# Patient Record
Sex: FEMALE | Race: WHITE | Employment: STUDENT | URBAN - METROPOLITAN AREA
[De-identification: names, ages, dates, MRNs, and addresses within clinical notes are randomized per-mention and may not be internally consistent; named-entity substitution may affect disease eponyms.]

---
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
## 2018-01-02 NOTE — PROGRESS NOTES
.[facility] [address] [facility] THERAPY  [address] [facility], [facility] [address],[facility], [facility] - Phone: [phone]  Fax: [phone]  DISCHARGE NOTE  Patient Name: Fadi Chowdhury : 2000   Treatment/Medical Diagnosis: Right shoulder pain [M25.511]   Referral Source: Sara Villa MD     Date of Initial Visit: 10/23/17 Attended Visits: 11 Missed Visits: 0     SUMMARY OF TREATMENT  Pt was seen for IE and 10 f/u visits with treatment consisting of therapeutic exercises for scapular stabilization/shoudler strengthening, manual therapy (prom/mobs/stm/stretching), return to throw via protocol and modalities prn. In addition pt was instructed on hep. CURRENT STATUS  Pt returned for only one visit after pn. The below findings were as of that visit. Goal/Measure of Progress Goal Met? 1. Pt will increase FOTO score >/=79/100 to show functional improvement   Status at last Eval: 56/100 Current Status: 84/100 yes   2. Pt will note +5 on GROC in order to show functional improvement   Status at last Eval: na Current Status: +5 yes   3. Pt will report daily max pain </=1/10 to facilitate return to sport with no restrictions   Status at last Eval: 10/10 Current Status: 2/10 progressing       RECOMMENDATIONS  D/c with hep  If you have any questions/comments please contact us directly at [phone]. Thank you for allowing us to assist in the care of your patient.     Therapist Signature: Ivonne Bass DPT, PRECIOUS Date: 2018     Time: 9:29 AM   NOTE TO PHYSICIAN:  PLEASE COMPLETE THE ORDERS BELOW AND FAX TO   [facility] Physical Therapy: [phone]  If you are unable to process this request in 24 hours please contact our office: [phone]    ___ I have read the above report and request that my patient continue as recommended.   ___ I have read the above report and request that my patient continue therapy with the following changes/special instructions:_________________________________________________________   ___ I have read the above report and request that my patient be discharged from therapy.      Physician Signature:        Date:       Time:

## 2018-04-11 ENCOUNTER — APPOINTMENT (OUTPATIENT)
Dept: PHYSICAL THERAPY | Age: 18
End: 2018-04-11
Payer: COMMERCIAL

## 2018-04-12 ENCOUNTER — HOSPITAL ENCOUNTER (OUTPATIENT)
Dept: PHYSICAL THERAPY | Age: 18
Discharge: HOME | End: 2018-04-12
Payer: COMMERCIAL

## 2018-04-12 PROCEDURE — 97162 PT EVAL MOD COMPLEX 30 MIN: CPT

## 2018-04-12 PROCEDURE — 97535 SELF CARE MNGMENT TRAINING: CPT

## 2018-04-12 PROCEDURE — 97140 MANUAL THERAPY 1/> REGIONS: CPT

## 2018-04-12 NOTE — PROGRESS NOTES
[address] PHYSICAL THERAPY  [address] [place] [address], [facility] [address],[facility], [address] [facility] - Phone: [phone]  Fax: [phone] / [address] [facility]  Patient Name: Elsa Brooks : 2000   Medical   Diagnosis: Right shoulder pain [M25.511] Treatment Diagnosis: Right shoulder pain [M25.511]   Onset Date: 2018     Referral Source: Indira Hernandez MD [facility]): 2018   Prior Hospitalization: See medical history Provider #: [id number]   Prior Level of Function: Pain free throwing   Comorbidities: none   Medications: Verified on Patient Summary List   The Plan of Care and following information is based on the information from the initial evaluation.   ===========================================================================================  Assessment / key information:  Elsa Brooks is a 21 y.o.  yo female with Dx of Right shoulder pain [M25.511]. She reports the onset of R shoulder pain after sliding into a base during a soft ball game in . She currently rates her pain as 10/10 at worst, 2/10 at best, primarily located at anterior and lateral aspect of her R shoudler. She complains of difficulty and increase pain with overhead activity and throwing. Her recent MRI result confirmed R labral tear. She also complains of numbness/tingling at the follow through portion of throwing. Objective Findings:  Shoulder AROM: Flx: R = 155 deg, L = 175 deg, Scaption: R = 162 deg, L = 180 deg,   Ext: R = 70 deg, L = 74 deg,  ER: R = 80 deg, L = 90 deg,  IR: R = 27 cm measured from C7 to thumb behind the back, L = 22 cm measured from C7 to thumb behind the back, Manual Muscle Testing:  R shoulder ER is slightly Reduced with pain. Special Test:    Upper Limb Tension Test: + on R. All cervical tests were negative.    Pt instructed in HEP and will f/u in clinic for PT.  ===========================================================================================  Eval Complexity: History MEDIUM  Complexity : 1-2 comorbidities / personal factors will impact the outcome/ POC ;  Examination  MEDIUM Complexity : 3 Standardized tests and measures addressing body structure, function, activity limitation and / or participation in recreation ; Presentation MEDIUM Complexity : Evolving with changing characteristics ; Decision Making MEDIUM Complexity : FOTO score of 26-74; Overall Complexity MEDIUM  Problem List: pain affecting function, decrease ROM, decrease strength, decrease ADL/ functional abilitiies, decrease activity tolerance and decrease flexibility/ joint mobility   Treatment Plan may include any combination of the following: Therapeutic exercise, Therapeutic activities, Neuromuscular re-education, Physical agent/modality, Manual therapy, Patient education, Self Care training and Functional mobility training  Patient / Family readiness to learn indicated by: asking questions, trying to perform skills and interest  Persons(s) to be included in education: patient (P)  Barriers to Learning/Limitations: no  Measures taken: FOTO = 65%   Patient Goal (s): Return to throwing   Patient self reported health status: excellent  Rehabilitation Potential: good   Short Term Goals: To be accomplished in  1-2  weeks:  1. Independent with HEP. 2. Decrease max pain 25-50% to assist with return to sport  [address]: To be accomplished in  3-4  weeks:  1. Decrease max pain 50-75% to assist with return to sport  2. Increase FOTO score to 79% to show functional improvment  3. Will rate  >/= +5 on Global Rating of Change and be prepared to DC to HEP.   Frequency / Duration:   Patient to be seen  2-3  times per week for 3-4  weeks:  Patient / Caregiver education and instruction: self care and exercises    Therapist Signature: Divya Loera DPT, OCS, SCS, CSCS Date: 2/14/1362   Certification Period: na Time: 9:13 AM   =================================================================================I certify that the above Physical Therapy Services are being furnished while the patient is under my care. I agree with the treatment plan and certify that this therapy is necessary. Physician Signature:        Date:       Time:     Please sign and return to [facility] [place] or you may fax the signed copy to [phone]. Thank you.

## 2018-04-12 NOTE — PROGRESS NOTES
PHYSICAL THERAPY - DAILY TREATMENT NOTE    Patient Name: Danita Zabala        Date: 2018  : 2000   YES Patient  Verified  Visit #:     Insurance: Payor: Luis Clark / Plan: [address] [place] [address] / Product Type: PPO /      In time: 8:25 Out time: 9:05   Total Treatment Time: 40     Medicare Time Tracking (below)   Total Timed Codes (min):  na 1:1 Treatment Time:  na     TREATMENT AREA =  Right shoulder pain [M25.511]    SUBJECTIVE  Pain Level (on 0 to 10 scale):    Medication Changes/New allergies or changes in medical history, any new surgeries or procedures? NO    If yes, update Summary List   Subjective Functional Status/Changes:  []  No changes reported     SEE IE          OBJECTIVE    10 min Manual Therapy: DTM to burr lats, med n. glide   Rationale:      decrease pain, increase ROM and increase tissue extensibility to improve patient's ability to return to sport     min Patient Education:  YES  Reviewed HEP   []  Progressed/Changed HEP based on: Other Objective/Functional Measures:    SEE IE     Post Treatment Pain Level (on 0 to 10) scale:       ASSESSMENT  Assessment/Changes in Function:     SEE IE     []  See Progress Note/Recertification   Patient will continue to benefit from skilled PT services to modify and progress therapeutic interventions, address functional mobility deficits, address ROM deficits, address strength deficits, analyze and address soft tissue restrictions, analyze and cue movement patterns, analyze and modify body mechanics/ergonomics and assess and modify postural abnormalities to attain remaining goals.    Progress toward goals / Updated goals:         PLAN  []  Upgrade activities as tolerated YES Continue plan of care   []  Discharge due to :    []  Other:      Therapist: Divya Loera, PT, OCS, SCS, CSCS    Date: 2018 Time: 9:12 AM       Future Appointments  Date Time Provider Carla Catalan   2018 10:00 AM Joselyn Whitman PTA [facility]   4/20/2018 10:00 AM Linda Lara, PT [facility]

## 2018-04-17 ENCOUNTER — HOSPITAL ENCOUNTER (OUTPATIENT)
Dept: PHYSICAL THERAPY | Age: 18
Discharge: HOME | End: 2018-04-17
Payer: COMMERCIAL

## 2018-04-17 PROCEDURE — 97110 THERAPEUTIC EXERCISES: CPT

## 2018-04-17 PROCEDURE — 97140 MANUAL THERAPY 1/> REGIONS: CPT

## 2018-04-17 NOTE — PROGRESS NOTES
PHYSICAL THERAPY - DAILY TREATMENT NOTE    Patient Name: David Steve        Date: 2018  : 2000   YES Patient  Verified  Visit #:      of   12  Insurance: Payor: BLUE CROSS / Plan: [address] [place] [address] / Product Type: PPO /      In time: 955 Out time: 1055   Total Treatment Time: 60     Medicare Time Tracking (below)   Total Timed Codes (min):  50 1:1 Treatment Time:       TREATMENT AREA =  Right shoulder pain [M25.511]    SUBJECTIVE  Pain Level (on 0 to 10 scale):  3  / 10   Medication Changes/New allergies or changes in medical history, any new surgeries or procedures? NO    If yes, update Summary List   Subjective Functional Status/Changes:  []  No changes reported     \"I've got 2 weeks left in the season. \"  Pt plans to continue playing through the shoulder injury until then. OBJECTIVE  Modalities Rationale:     decrease inflammation and decrease pain to improve patient's ability to perform pain free ADLs. min [] Estim, type/location:                                      []  att     []  unatt     []  w/US     []  w/ice    []  w/heat    min []  Mechanical Traction: type/lbs                   []  pro   []  sup   []  int   []  cont    []  before manual    []  after manual    min []  Ultrasound, settings/location:      min []  Iontophoresis w/ dexamethasone, location:                                               []  take home patch       []  in clinic   10 min [x]  Ice     []  Heat    location/position: Semi-reclined (R) shoulder. min []  Vasopneumatic Device, press/temp:     min []  Other:    [x] Skin assessment post-treatment (if applicable):    [x]  intact    []  redness- no adverse reaction     []redness  adverse reaction:        35 min Therapeutic Exercise:  [x]  See flow sheet   Rationale:      increase ROM, increase strength and improve coordination to improve the patients ability to perform pain free ADLs. 15 Min Manual Therapy: (R) shoulder PROM.   TPR (R) periscapular mms. Rationale:      decrease pain, increase ROM, increase tissue extensibility and decrease trigger points to improve patient's ability to perform pain free ADLs. min Patient Education:  YES  Reviewed HEP   []  Progressed/Changed HEP based on: Other Objective/Functional Measures: Added multiple exercises to improve shoulder strength and stability for ADLs. Post Treatment Pain Level (on 0 to 10) scale:   3  / 10     ASSESSMENT  Assessment/Changes in Function:     Multiple trigger points in the (R) periscapular mms. Good pain relief w/ shoulder distraction + oscillation. []  See Progress Note/Recertification   Patient will continue to benefit from skilled PT services to modify and progress therapeutic interventions, address functional mobility deficits, address ROM deficits, address strength deficits, analyze and address soft tissue restrictions, analyze and cue movement patterns, analyze and modify body mechanics/ergonomics and assess and modify postural abnormalities to attain remaining goals. Progress toward goals / Updated goals:    Initiated therex.        PLAN  [x]  Upgrade activities as tolerated YES Continue plan of care   []  Discharge due to :    []  Other:      Therapist: Carleen Morelos PTA    Date: 4/17/2018 Time: 10:56 AM     Future Appointments  Date Time Provider Carla Catalan   4/20/2018 10:00 AM Cristobal Richter PT [facility]

## 2018-04-20 ENCOUNTER — APPOINTMENT (OUTPATIENT)
Dept: PHYSICAL THERAPY | Age: 18
End: 2018-04-20
Payer: COMMERCIAL

## 2018-04-26 ENCOUNTER — HOSPITAL ENCOUNTER (OUTPATIENT)
Dept: PHYSICAL THERAPY | Age: 18
Discharge: HOME | End: 2018-04-26
Payer: COMMERCIAL

## 2018-04-26 PROCEDURE — 97140 MANUAL THERAPY 1/> REGIONS: CPT | Performed by: PHYSICAL THERAPIST

## 2018-04-26 PROCEDURE — 97110 THERAPEUTIC EXERCISES: CPT | Performed by: PHYSICAL THERAPIST

## 2018-04-26 NOTE — PROGRESS NOTES
[facility] PHYSICAL THERAPY - DAILY TREATMENT NOTE    Patient Name: Cristy Vogel        Date: 2018  : 2000   yes Patient  Verified  Visit #:   3   of   12  Insurance: Payor: Bhupendra Cerda / Plan: [address] [place] [address] / Product Type: PPO /      In time: 455 Out time: 548   Total Treatment Time: 52     Medicare Time Tracking (below)   Total Timed Codes (min):  na 1:1 Treatment Time:  na     TREATMENT AREA =  Right shoulder pain [M25.511]    SUBJECTIVE  Pain Level (on 0 to 10 scale):  2  / 10   Medication Changes/New allergies or changes in medical history, any new surgeries or procedures?    no  If yes, update Summary List   Subjective Functional Status/Changes:  []  No changes reported     We made it to play offs so we have a tournament next week but its a one and done so we will see how           OBJECTIVE  Modalities Rationale:     decrease inflammation, decrease pain and increase tissue extensibility to improve patient's ability to complete adls   min [] Estim, type/location:                                      []  att     []  unatt     []  w/US     []  w/ice    []  w/heat    min []  Mechanical Traction: type/lbs                   []  pro   []  sup   []  int   []  cont    []  before manual    []  after manual    min []  Ultrasound, settings/location:      min []  Iontophoresis w/ dexamethasone, location:                                               []  take home patch       []  in clinic   10 min [x]  Ice     []  Heat    location/position: Long sit    min []  Vasopneumatic Device, press/temp:     min []  Other:    [x] Skin assessment post-treatment (if applicable):    []  intact    [x]  redness- no adverse reaction     []redness  adverse reaction:        25 min Therapeutic Exercise:  [x]  See flow sheet   Rationale:      increase ROM, increase strength and increase proprioception to improve the patients ability to complete adls     17 min Manual Therapy: ghj flex, post cuff release, distal biceps release, ir stretch   Rationale:      decrease pain, increase ROM, increase tissue extensibility and decrease trigger points to improve patient's ability to complete adls        min Patient Education:  yes  Reviewed HEP   []  Progressed/Changed HEP based on: Other Objective/Functional Measures:    Significant tenderness and restrictions along biceps and post cuff   Required cues for prone te for firing sequence and once cues able to achieve      Post Treatment Pain Level (on 0 to 10) scale:   0  / 10     ASSESSMENT  Assessment/Changes in Function:     No increase in pain following session      []  See Progress Note/Recertification   Patient will continue to benefit from skilled PT services to modify and progress therapeutic interventions, address functional mobility deficits, address ROM deficits, address strength deficits, analyze and address soft tissue restrictions, analyze and cue movement patterns, analyze and modify body mechanics/ergonomics, assess and modify postural abnormalities and instruct in home and community integration to attain remaining goals. Progress toward goals / Updated goals:    Pt is moving to [place] 5/9/18 with orthopedic consult for labral tear on 5/11/18. Will continue therapy per md request until pt leaves area to address above findings      PLAN  []  Upgrade activities as tolerated yes Continue plan of care   []  Discharge due to :    []  Other:      Therapist: Nir Gomez, PT    Date: 4/26/2018 Time: 5:30 PM     No future appointments.

## 2018-06-05 NOTE — PROGRESS NOTES
[address] PHYSICAL THERAPY  [address] [place] [address], [place] [address],[facility], [address] [facility] - Phone: [phone]  Fax: [phone] SUMMARY  Patient Name: Amadou Romero : 2000   Treatment/Medical Diagnosis: Right shoulder pain [M25.511]   Referral Source: Ximena Mendoza MD     Date of Initial Visit: 18 Attended Visits: 3 Missed Visits: 0     SUMMARY OF TREATMENT  Amadou Romero has been seen at our clinic 2-3x/wk for a total of 3 visits. Pt treatment has consisted of  therapeutic exercise for shoulder ROM, shoulder/scapular stability, and manual therapy (jt mobilization and deep tissue mobilization)    CURRENT STATUS  Pt has had a good tolerance to physical therapy treatment. Pt moved out of town and did not return PT treatment after the 3rd visit. RECOMMENDATIONS  Discharge from physical therapy treatment with HEP. If you have any questions/comments please contact us directly at [phone]. Thank you for allowing us to assist in the care of your patient.     Therapist Signature: Serena Coyle DPT, OCS, SCS, CSCS Date: 2018     Time: 6:09 PM

## 2018-11-26 ENCOUNTER — HOSPITAL ENCOUNTER (OUTPATIENT)
Dept: PHYSICAL THERAPY | Age: 18
Discharge: HOME | End: 2018-11-26
Payer: COMMERCIAL

## 2018-11-26 PROCEDURE — 97162 PT EVAL MOD COMPLEX 30 MIN: CPT

## 2018-11-26 PROCEDURE — 97110 THERAPEUTIC EXERCISES: CPT

## 2018-11-26 PROCEDURE — 97140 MANUAL THERAPY 1/> REGIONS: CPT

## 2018-11-26 NOTE — PROGRESS NOTES
[address] PHYSICAL THERAPY 
[address] [place] [address], [place] [address],[facility], [facility] - Phone: [phone]  Fax: [phone] PLAN OF CARE / STATEMENT OF MEDICAL NECESSITY FOR PHYSICAL THERAPY SERVICES Patient Name: Clarissa Murphy : 2000 Medical  
Diagnosis: Right shoulder injury [S49.91XA] Treatment Diagnosis: Right shoulder injury [S49.91XA] Right shoulder pain [M25.511] Onset Date: 2018 Referral Source: Shari Fishman M.D. [place] [facility]): 2018 Prior Hospitalization: See medical history Provider #: [id number] Prior Level of Function: No limitations prior to onset. Recreational activity: softball pitching Comorbidities: R labral repair 2018, R KAYY 2018 Medications: Verified on Patient Summary List  
The Plan of Care and following information is based on the information from the initial evaluation.  
=========================================================================================== Assessment / key information:  Pt is a 21y.o. year old RHD female with subjective complaints of R shoulder pain/stiffness s/p KAYY 2018. Pt with previous injury to R labrum with repair performed on 2018, however insufficient return of ROM leading to KAYY. Current pain is rated as 0 to 10/10. Current functional limitations: lifting, reaching, unable to pitch softball. FOTO score= 67/100 indicating 33% impairment to functional activities. Today's evaulation is significant for:  
 
     RUFF                       PROM                   Strength (1-5) Shoulder Left Right Left  Right Left  Right Flexion  155*  165*  4 Abduction  125*  125*  4 Internal Rotation 
(@) 90 deg. Abd  65  65  4 External Rotation 
(@) 90 deg. Abd  65*  65*  4 Mid Traps      4- Low Traps      3+ *functional IR behind back: R L1, L T4. These findings are supportive for diagnosis of R shoulder pain.   Pt will be a good candidate for skilled PT to address these impairments and promote return to normal ADLs and functional mobility for improved quality of life. 
=========================================================================================== Eval Complexity: History MEDIUM  Complexity : 1-2 comorbidities / personal factors will impact the outcome/ POC ;  Examination  MEDIUM Complexity : 3 Standardized tests and measures addressing body structure, function, activity limitation and / or participation in recreation ; Presentation MEDIUM Complexity : Evolving with changing characteristics ; Decision Making MEDIUM Complexity : FOTO score of 26-74; Overall Complexity MEDIUM Problem List: pain affecting function, decrease ROM, decrease strength, decrease ADL/ functional abilitiies, decrease activity tolerance and decrease flexibility/ joint mobility Treatment Plan may include any combination of the following: Therapeutic exercise, Therapeutic activities, Neuromuscular re-education, Physical agent/modality, Manual therapy and Patient education Patient / Family readiness to learn indicated by: asking questions, trying to perform skills and interest 
Persons(s) to be included in education: patient (P) Barriers to Learning/Limitations: None Measures taken:   
Patient Goal (s): \"get my mobility back\" Patient self reported health status: excellent Rehabilitation Potential: good ? Short Term Goals: To be accomplished in  2  weeks: 1. Pt will be educated in appropriate HEP to decrease pain, increase ROM, increase strength and return pt to PLOF. 2. Pt will increase R shoulder flexion ROM by >/= 10 degrees in order to promote reaching [address]. 3. Pt will increase R shoulder ER by >/= 10 degrees to promote return to pitching. ? Long Term Goals: To be accomplished in  4-6  weeks: 1. Pt will improve FOTO score to >/= 75 to demo a significant improvement in functional activity tolerance. 2. Pt will achieve >/= +5 GROC in order to promote increased activity tolerance. 3. Pt will report decreased pain c/o to </= 1/10 to facilitate return to sport. Frequency / Duration:   Patient to be seen  5  times per week for 2  weeks, then 2 x/wk for 2 weeks: 
Patient / Caregiver education and instruction: activity modification and exercises G-Codes (GP): n/a Therapist Signature: Tray Santizo. Chichi PT Date: 11/26/2018 Certification Period: n/a Time: 9:14 AM  
=========================================================================================== I certify that the above Physical Therapy Services are being furnished while the patient is under my care. I agree with the treatment plan and certify that this therapy is necessary. Physician Signature:       Date:      Time:  Please sign and return to [facility] [place]. or you may fax the signed copy to [phone]. Thank you.

## 2018-11-26 NOTE — PROGRESS NOTES
PHYSICAL THERAPY - DAILY TREATMENT NOTE Patient Name: Andrés Novak        Date: 2018 : 2000   yes Patient  Verified Visit #:     Insurance: Payor: BLUE CROSS / Plan: [address] [place] [address] / Product Type: PPO / In time: 230 Out time: 305 Total Treatment Time: 35 Medicare/[employer] Time Tracking (below) Total Timed Codes (min):  35 1:1 Treatment Time:  n/a  
TREATMENT AREA =  Right shoulder injury [S49.91XA] SUBJECTIVE Pain Level (on 0 to 10 scale):  0-1  / 10 Medication Changes/New allergies or changes in medical history, any new surgeries or procedures?    no  If yes, update Summary List  
Subjective Functional Status/Changes:  []  No changes reported See POC OBJECTIVE See exam on chart for details on objective findings 10 min Therapeutic Exercise:  [x]  See flow sheet Rationale:      increase ROM to improve the patients ability to lift, reach and carry 10 min Manual Therapy: PROM R shoulder all planes. Pec stretching Rationale:      decrease pain, increase ROM and increase tissue extensibility to improve patient's ability to lift, reach and carry 
 min Patient Education:  yes  Reviewed HEP []  Progressed/Changed HEP based on: Other Objective/Functional Measures: 
 
See POC Post Treatment Pain Level (on 0 to 10) scale:   0  / 10 ASSESSMENT Assessment/Changes in Function:  
 
See POC []  See Progress Note/Recertification Patient will continue to benefit from skilled PT services to modify and progress therapeutic interventions, address functional mobility deficits, address ROM deficits, address strength deficits, analyze and address soft tissue restrictions, analyze and cue movement patterns and analyze and modify body mechanics/ergonomics to attain remaining goals. Progress toward goals / Updated goals: 
See POC PLAN 
[]  Upgrade activities as tolerated yes Continue plan of care  
[]  Discharge due to :   
[]  Other: Therapist: Laura Cadet, PT Date: 11/26/2018 Time: 9:04 AM  
 
Future Appointments Date Time Provider Carla Catalan 11/27/2018 10:30 AM Kyle Franklin, PT [facility]  
11/28/2018 10:30 AM Young Marcano [facility]  
11/29/2018  7:00 AM Macho Archibald [facility]  
11/30/2018  9:00 AM Macho Archibald [facility]  
12/3/2018 12:00 PM Agnesmemo Gerberrachana [facility]  
12/4/2018 10:30 AM Macho Archibald [facility]  
12/5/2018  7:00 AM Macho Archibald [facility]  
12/6/2018  8:30 AM Thomas Rodriguez [facility]  
12/7/2018  9:30 AM Roselia Lu [facility]  
12/11/2018  9:00 AM Macho Archibald [facility]  
12/14/2018  9:30 AM Roselia Lu [facility]  
12/18/2018  9:00 AM Macho Archibald [facility]  
12/21/2018  9:00 AM Ellen Chávez, PT [facility]

## 2018-11-27 ENCOUNTER — HOSPITAL ENCOUNTER (OUTPATIENT)
Dept: PHYSICAL THERAPY | Age: 18
Discharge: HOME | End: 2018-11-27
Payer: COMMERCIAL

## 2018-11-27 PROCEDURE — 97110 THERAPEUTIC EXERCISES: CPT | Performed by: PHYSICAL THERAPIST

## 2018-11-27 PROCEDURE — 97140 MANUAL THERAPY 1/> REGIONS: CPT | Performed by: PHYSICAL THERAPIST

## 2018-11-27 NOTE — PROGRESS NOTES
.. PHYSICAL THERAPY - DAILY TREATMENT NOTE Patient Name: Francesca Garcia        Date: 2018 : 2000   yes Patient  Verified Visit #:   2   of   14  Insurance: Payor: BLUE CROSS / Plan: [address] [place] [address] / Product Type: PPO / In time: 1030 Out time: 1125 Total Treatment Time: 54 Medicare/[facility] Time Tracking (below) Total Timed Codes (min):  na 1:1 Treatment Time:  na  
TREATMENT AREA =  Right shoulder pain [M25.511] SUBJECTIVE Pain Level (on 0 to 10 scale):  1  / 10 Medication Changes/New allergies or changes in medical history, any new surgeries or procedures?    no  If yes, update Summary List  
Subjective Functional Status/Changes:  []  No changes reported I feel pretty good, just a little sore but overall I feel good considering I just had the manipulation. OBJECTIVE Modalities Rationale:     decrease pain to improve patient's ability to perform ADLs. min [] Estim, type/location:   
                                 []  att     []  unatt     []  w/US     []  w/ice    []  w/heat 
 min []  Mechanical Traction: type/lbs   
               []  pro   []  sup   []  int   []  cont    []  before manual    []  after manual  
 min []  Ultrasound, settings/location:    
 min []  Iontophoresis w/ dexamethasone, location:   
                                           []  take home patch       []  in clinic  
10 min [x]  Ice     []  Heat    location/position: Long sit  
 min []  Vasopneumatic Device, press/temp:   
 min []  Other:   
35 min Therapeutic Exercise:  [x]  See flow sheet Rationale:      increase ROM, increase strength and improve coordination to improve the patients ability to perform ADLs. 10 min Manual Therapy: PROM elevation, IR, ER, BLH stretch Rationale:      increase ROM and increase tissue extensibility to improve patient's ability to perform ADLs. min Patient Education:  yes  Reviewed HEP []  Progressed/Changed HEP based on: Other Objective/Functional Measures: 
 
ttp on bicep muscle belly Increased muscle tension along post cuff Added cane IR into POC to improve IR ROM, will progress POC to pt tolerance next session Post Treatment Pain Level (on 0 to 10) scale:   0  / 10 ASSESSMENT Assessment/Changes in Function:  
 
Pt tolerated exercises well w/o increase in p!.  Main limitation is p! At end range IR/flexion 
  
[]  See Progress Note/Recertification Patient will continue to benefit from skilled PT services to modify and progress therapeutic interventions, address functional mobility deficits, address ROM deficits, address strength deficits, analyze and address soft tissue restrictions and analyze and cue movement patterns to attain remaining goals. Progress toward goals / Updated goals: 
Progress in order to return to PLOF. Per md to return 5x week to improve rom PLAN 
[]  Upgrade activities as tolerated yes Continue plan of care  
[]  Discharge due to :   
[]  Other:   
 
Therapist: Miguel Angel Briceño SPT Date: 11/27/2018 Time: 10:38 AM  
 
Future Appointments Date Time Provider Carla Catalan 11/28/2018 10:30 AM Tanis Phalen, [facility]  
11/29/2018  7:00 AM Martita Guillaume [facility]  
11/30/2018  9:00 AM Martita Guillaume PTA [facility]  
12/3/2018 12:00 PM Zeina Schulz [facility]  
12/4/2018 10:30 AM Martita Guillaume PTA [facility]  
12/5/2018  7:00 AM Martita Guillaume [facility]  
12/6/2018  8:30 AM Jessie Renner [facility]  
12/7/2018  9:30 AM Tessie Lu [facility]  
12/11/2018  9:00 AM Martita Guillaume [facility]  
12/14/2018  9:30 AM Tessie Lu [facility]  
12/18/2018  9:00 AM Martita Guillaume [facility]  
12/21/2018  9:00 AM Yuriy Chávez, PT [facility]

## 2018-11-28 ENCOUNTER — HOSPITAL ENCOUNTER (OUTPATIENT)
Dept: PHYSICAL THERAPY | Age: 18
Discharge: HOME | End: 2018-11-28
Payer: COMMERCIAL

## 2018-11-28 PROCEDURE — 97140 MANUAL THERAPY 1/> REGIONS: CPT

## 2018-11-28 PROCEDURE — 97110 THERAPEUTIC EXERCISES: CPT

## 2018-11-28 NOTE — PROGRESS NOTES
PHYSICAL THERAPY - DAILY TREATMENT NOTE Patient Name: Andrés Novak        Date: 2018 : 2000   YES Patient  Verified Visit #:   3   of   12  Insurance: Payor: BLUE CROSS / Plan: [address] [place] [address] / Product Type: PPO / In time: 1045 Out time: 1145 Total Treatment Time: 60 Medicare Time Tracking (below) Total Timed Codes (min):  50 1:1 Treatment Time:    
TREATMENT AREA =  Right shoulder pain [M25.511] SUBJECTIVE Pain Level (on 0 to 10 scale):  0  / 10 Medication Changes/New allergies or changes in medical history, any new surgeries or procedures? NO    If yes, update Summary List  
Subjective Functional Status/Changes:  []  No changes reported Pt reporting no pain today. OBJECTIVE Modalities Rationale:     decrease inflammation and decrease pain to improve patient's ability to perform pain free ADLs. min [] Estim, type/location:   
                                 []  att     []  unatt     []  w/US     []  w/ice    []  w/heat 
 min []  Mechanical Traction: type/lbs   
               []  pro   []  sup   []  int   []  cont    []  before manual    []  after manual  
 min []  Ultrasound, settings/location:    
 min []  Iontophoresis w/ dexamethasone, location:   
                                           []  take home patch       []  in clinic  
10 min [x]  Ice     []  Heat    location/position: Supine, (R) shoulder. min []  Vasopneumatic Device, press/temp:   
 min []  Other:   
[x] Skin assessment post-treatment (if applicable):   
[x]  intact    []  redness- no adverse reaction    
[]redness  adverse reaction:     
30 min Therapeutic Exercise:  [x]  See flow sheet Rationale:      increase ROM, increase strength and improve coordination to improve the patients ability to perform pain free ADLs. 20 min Manual Therapy: (R) shoulder PROM. Distraction w/ oscillation.     
Rationale:      decrease pain, increase ROM, increase tissue extensibility and decrease trigger points to improve patient's ability to perform pain free ADLs. min Patient Education:  YES  Reviewed HEP []  Progressed/Changed HEP based on: Other Objective/Functional Measures: Added scaption and ball circles on wall to improve shoulder/upper trunk strength and stability for ADLs. Post Treatment Pain Level (on 0 to 10) scale:   0  / 10 ASSESSMENT Assessment/Changes in Function:  
 
Demonstrating good PROM flex and Er. IR limited. Pain at end range in all directions. []  See Progress Note/Recertification Patient will continue to benefit from skilled PT services to modify and progress therapeutic interventions, address functional mobility deficits, address ROM deficits, address strength deficits, analyze and address soft tissue restrictions, analyze and cue movement patterns, analyze and modify body mechanics/ergonomics and assess and modify postural abnormalities to attain remaining goals. Progress toward goals / Updated goals: 
 
Progressing toward LTG #3. PLAN [x]  Upgrade activities as tolerated YES Continue plan of care  
[]  Discharge due to :   
[]  Other:   
 
Therapist: Adan Talavera PTA Date: 11/28/2018 Time: 10:48 AM  
 
Future Appointments Date Time Provider Carla Catalan 11/29/2018  7:00 AM Ilsa Duvall [place] [address]  
11/30/2018  9:00 AM Ilsa Duvall [place] [address]  
12/3/2018 12:00 PM Ignacio Blood [place] [address]  
12/4/2018 10:30 AM Ilsa Duvall [place] [address]  
12/5/2018  7:00 AM Ilsa Duvall [place] [address]  
12/6/2018  8:30 AM Frannie Pelayo [place] [address]  
12/7/2018  9:30 AM Karely Lu [place] [address]  
12/11/2018  9:00 AM Ilsa Duvall [place] [address]  
12/14/2018  9:30 AM Karely Lu [place] [address]  
12/18/2018  9:00 AM Ilsa Duvall [place] [address]  
12/21/2018  9:00 AM [facility] [place] [id number]

## 2018-11-29 ENCOUNTER — HOSPITAL ENCOUNTER (OUTPATIENT)
Dept: PHYSICAL THERAPY | Age: 18
Discharge: HOME | End: 2018-11-29
Payer: COMMERCIAL

## 2018-11-29 PROCEDURE — 97110 THERAPEUTIC EXERCISES: CPT

## 2018-11-29 PROCEDURE — 97140 MANUAL THERAPY 1/> REGIONS: CPT

## 2018-11-29 NOTE — PROGRESS NOTES
PHYSICAL THERAPY - DAILY TREATMENT NOTE Patient Name: Jin Freeman        Date: 2018 : 2000   YES Patient  Verified Visit #:      12  Insurance: Payor: BLUE CROSS / Plan: [address] [place] [address] / Product Type: PPO / In time: 700 Out time: 800 Total Treatment Time: 60 Medicare Time Tracking (below)/BCBS Total Timed Codes (min):   1:1 Treatment Time:    
TREATMENT AREA =  Right shoulder pain [M25.511] SUBJECTIVE Pain Level (on 0 to 10 scale):  0  / 10 Medication Changes/New allergies or changes in medical history, any new surgeries or procedures? NO    If yes, update Summary List  
Subjective Functional Status/Changes:  []  No changes reported I feel like i've gotten some more motion back in my arm since the manipulation OBJECTIVE 35 min Therapeutic Exercise:  [x]  See flow sheet Rationale:      increase ROM and increase strength to improve the patients ability to perform pain free ADLs. 25 min Manual Therapy: PROM elevation, IR, ER, BLH stretch, teres major release. [address] mob. Rationale:      decrease pain, increase ROM and increase tissue extensibility to improve patient's ability to perform pain free ADLs. min Patient Education:  YES  Reviewed HEP []  Progressed/Changed HEP based on: Other Objective/Functional Measures: 
 
Increase shoulder flexion AROM after manual. 
  
Post Treatment Pain Level (on 0 to 10) scale:   0  / 10 ASSESSMENT Assessment/Changes in Function: Add ABC ball on wall, ball drop, wall slide and supine scap stability 1 and 2. Patient received copy of scap stability for HEP: verbalized understanding and returned demonstration 
  
[]  See Progress Note/Recertification Patient will continue to benefit from skilled PT services to modify and progress therapeutic interventions, address ROM deficits, address strength deficits, analyze and address soft tissue restrictions and analyze and cue movement patterns to attain remaining goals. Progress toward goals / Updated goals: 
Progressing well with ROM and stability of shoulder/scap. PLAN 
[]  Upgrade activities as tolerated YES Continue plan of care  
[]  Discharge due to :   
[]  Other:   
 
Therapist: Cruz Medrano Date: 11/29/2018 Time: 6:04 AM  
 
 
Future Appointments Date Time Provider Carla Catalan 11/29/2018  7:00 AM Birthsoco Monae, PTA [facility]  
11/30/2018  9:00 AM Pietro Monae [facility]  
12/3/2018 12:00 PM Raji Lundberg [facility]  
12/4/2018 10:30 AM Pietro Monae [facility]  
12/5/2018  7:00 AM Pietro Monae [facility]  
12/6/2018  8:30 AM Marky Crenshaw [facility]  
12/7/2018  9:30 AM Hattie Lu [facility]  
12/11/2018  9:00 AM Pietro Monae PTA [facility]  
12/14/2018  9:30 AM Hattie Lu [facility]  
12/18/2018  9:00 AM Pietro Monae [facility]  
12/21/2018  9:00 AM aH Chávez, PT [facility]

## 2018-12-03 ENCOUNTER — HOSPITAL ENCOUNTER (OUTPATIENT)
Dept: PHYSICAL THERAPY | Age: 18
Discharge: HOME | End: 2018-12-03
Payer: COMMERCIAL

## 2018-12-03 PROCEDURE — 97110 THERAPEUTIC EXERCISES: CPT

## 2018-12-03 PROCEDURE — 97140 MANUAL THERAPY 1/> REGIONS: CPT

## 2018-12-03 NOTE — PROGRESS NOTES
PHYSICAL THERAPY - DAILY TREATMENT NOTE    Patient Name: Sonia Braswell        Date: 12/3/2018  : 2000   YES Patient  Verified  Visit #:     Insurance: Payor: Atiya Vogel / Plan: [address] [place] [address] / Product Type: PPO /      In time: 4743 Out time: 1   Total Treatment Time: 55     Medicare Time Tracking (below)   Total Timed Codes (min):  55 1:1 Treatment Time:       TREATMENT AREA =  Right shoulder pain [M25.511]    SUBJECTIVE  Pain Level (on 0 to 10 scale):  0  / 10   Medication Changes/New allergies or changes in medical history, any new surgeries or procedures? NO    If yes, update Summary List   Subjective Functional Status/Changes:  []  No changes reported     No new complaints. Says her range is getting better. OBJECTIVE  30 min Therapeutic Exercise:  [x]  See flow sheet   Rationale:      increase ROM, increase strength, improve coordination and improve balance to improve the patients ability to perform pain free ADLs. 25 min Manual Therapy: (R) shoulder PROM in all planes. Rationale:      decrease pain, increase ROM, increase tissue extensibility and decrease trigger points to improve patient's ability to perform pain free ADLs. min Patient Education:  YES  Reviewed HEP   []  Progressed/Changed HEP based on: Other Objective/Functional Measures: Added standing D2 w/ punch to activate serratus anterior, improve shoulder stability. Post Treatment Pain Level (on 0 to 10) scale:   0  / 10     ASSESSMENT  Assessment/Changes in Function:     No exacerbation of symptoms with today's session.       []  See Progress Note/Recertification   Patient will continue to benefit from skilled PT services to modify and progress therapeutic interventions, address functional mobility deficits, address ROM deficits, address strength deficits, analyze and address soft tissue restrictions, analyze and cue movement patterns, analyze and modify body mechanics/ergonomics and assess and modify postural abnormalities to attain remaining goals. Progress toward goals / Updated goals:    No change in progress toward LTG's with today's session.       PLAN  [x]  Upgrade activities as tolerated YES Continue plan of care   []  Discharge due to :    []  Other:      Therapist: Deepak Mas PTA    Date: 12/3/2018 Time: 12:37 PM     Future Appointments   Date Time Provider Carla Catalan   12/4/2018 10:30 AM [place] Nephew, PTA [facility]   12/5/2018  7:00 AM Siddharth Nephew, PTA [facility]   12/6/2018  8:30 AM Siddharth Nephew, PTA [facility]   12/7/2018  9:30 AM Aly Dominick [facility]   12/11/2018  9:00 AM Siddharth Nephew, PTA [facility]   [facility]14/2018  [facility]   12/18/2018  9:00 AM Siddharth Nephew, PTA [facility]   12/21/2018  9:00 AM Jey Chávez, PT [facility]

## 2018-12-04 ENCOUNTER — HOSPITAL ENCOUNTER (OUTPATIENT)
Dept: PHYSICAL THERAPY | Age: 18
Discharge: HOME | End: 2018-12-04
Payer: COMMERCIAL

## 2018-12-04 PROCEDURE — 97140 MANUAL THERAPY 1/> REGIONS: CPT

## 2018-12-04 PROCEDURE — 97110 THERAPEUTIC EXERCISES: CPT

## 2018-12-04 NOTE — PROGRESS NOTES
PHYSICAL THERAPY - DAILY TREATMENT NOTE    Patient Name: Domingo Nur        Date: 2018  : 2000   YES Patient  Verified  Visit #:     Insurance: Payor: Veto Arboleda / Plan: [address] [place] [address] / Product Type: PPO /      In time: 1030 Out time: 1130   Total Treatment Time: 60     Medicare Time Tracking (below)/BCBS   Total Timed Codes (min):   1:1 Treatment Time:       TREATMENT AREA =  Right shoulder pain [M25.511]    SUBJECTIVE  Pain Level (on 0 to 10 scale):  0  / 10   Medication Changes/New allergies or changes in medical history, any new surgeries or procedures? NO    If yes, update Summary List   Subjective Functional Status/Changes:  []  No changes reported     My arm has been doing really good. OBJECTIVE    35 min Therapeutic Exercise:  [x]  See flow sheet   Rationale:      increase ROM and increase strength to improve the patients ability to perform pain free ADLs.        25 min Manual Therapy: PROM elevation, IR, ER, BLH stretch, teres major release. [address] [facility]   Rationale:      decrease pain, increase ROM and increase tissue extensibility to improve patient's ability to perform pain free ADLs.        min Patient Education:  YES  Reviewed HEP   []  Progressed/Changed HEP based on: Other Objective/Functional Measures: Add arm [place] with red ball 10 x  GROC +3 somewhat better  foto 70     Post Treatment Pain Level (on 0 to 10) scale:   0  / 10     ASSESSMENT  Assessment/Changes in Function:     Reports no increase of pain with all general ADLs. Continue to report pinching along biceps region with OH and end range shoulder flexion     []  See Progress Note/Recertification   Patient will continue to benefit from skilled PT services to modify and progress therapeutic interventions, address ROM deficits, address strength deficits, analyze and address soft tissue restrictions and analyze and cue movement patterns to attain remaining goals.    Progress toward goals / Updated goals:    Progressing slowly with end range pain reduction.      PLAN  []  Upgrade activities as tolerated YES Continue plan of care   []  Discharge due to :    []  Other:      Therapist: HANNA Hammond    Date: 12/4/2018 Time: 6:00 AM       Future Appointments   Date Time Provider Carla Catalan   12/4/2018 10:30 AM Mitzi Miranda, PTA [facility]   12/5/2018  7:00 AM Mitzi Miranda, [facility]   12/6/2018  8:30 AM Mitzi Miranda, [facility]   12/7/2018  9:30 AM Yasmani Lu [facility]   12/11/2018  9:00 AM Mitzi Miranda, [facility]   12/14/2018  9:30 AM Yasmani Lu [facility]   12/18/2018  9:00 AM Mitzi Miranda, [facility]   12/21/2018  9:00 AM Simran Chávez, PT [facility]

## 2018-12-05 ENCOUNTER — HOSPITAL ENCOUNTER (OUTPATIENT)
Dept: PHYSICAL THERAPY | Age: 18
Discharge: HOME | End: 2018-12-05
Payer: COMMERCIAL

## 2018-12-05 PROCEDURE — 97140 MANUAL THERAPY 1/> REGIONS: CPT

## 2018-12-05 PROCEDURE — 97110 THERAPEUTIC EXERCISES: CPT

## 2018-12-05 NOTE — PROGRESS NOTES
PHYSICAL THERAPY - DAILY TREATMENT NOTE    Patient Name: Sonia Braswell        Date: 2018  : 2000   YES Patient  Verified  Visit #:     Insurance: Payor: Atiya Vogel / Plan: [address] [place] [address] / Product Type: PPO /      In time: 700 Out time: 800   Total Treatment Time: 60     Medicare Time Tracking (below)/BCBS   Total Timed Codes (min):   1:1 Treatment Time:       TREATMENT AREA Right shoulder pain [M25.511]      SUBJECTIVE  Pain Level (on 0 to 10 scale):  0  / 10   Medication Changes/New allergies or changes in medical history, any new surgeries or procedures? NO    If yes, update Summary List   Subjective Functional Status/Changes:  []  No changes reported     Felt good after last session. OBJECTIVE    35 min Therapeutic Exercise:  [x]  See flow sheet   Rationale:      increase ROM and increase strength to improve the patients ability to perform pain free ADLs.        25 min Manual Therapy: PROM elevation, IR, ER, BLH stretch, teres major release. [address] [facility] [place]   Rationale:      decrease pain, increase ROM and increase tissue extensibility to improve patient's ability to perform pain free ADLs.         min Patient Education:  YES  Reviewed HEP   []  Progressed/Changed HEP based on: Other Objective/Functional Measures:    Shoulder flexion: 155 (IE); AROM/PROM: 165/170  Shoulder ER@ 90: 65(IE); AROM/PROM: 65/70    FIR:T12, YANG: behind head     Post Treatment Pain Level (on 0 to 10) scale:   0  / 10     ASSESSMENT  Assessment/Changes in Function:     Reports decreasing difficulty with OH activities. []  See Progress Note/Recertification   Patient will continue to benefit from skilled PT services to modify and progress therapeutic interventions, address ROM deficits, address strength deficits, analyze and address soft tissue restrictions and analyze and cue movement patterns to attain remaining goals. Progress toward goals / Updated goals: · Short Term Goals:  To be accomplished in  2  weeks:  1. Pt will be educated in appropriate HEP to decrease pain, increase ROM, increase strength and return pt to PLOF.  Achieved  2. Pt will increase R shoulder flexion ROM by >/= 10 degrees in order to promote reaching OH. Progressing  3. Pt will increase R shoulder ER by >/= 10 degrees to promote return to pitching. Progressing  Patient to be [address] in 2 more visits secondary to being [place] for the hiolidays til the end of January.          PLAN  []  Upgrade activities as tolerated YES Continue plan of care   []  Discharge due to :    []  Other:      Therapist: HANNA Blair    Date: 12/5/2018 Time: 6:17 AM       Future Appointments   Date Time Provider Carla Catalan   12/5/2018  7:00 AM John Serrano PTA [facility]   12/6/2018  8:30 AM John Serrano PTA [facility]   12/7/2018  9:30 AM Janneth Lu [facility]   12/11/2018  9:00 AM John Serrano PTA [facility]   12/14/2018  9:30 AM Janneth Lu [facility]   12/18/2018  9:00 AM John Serrano PTA [facility]   12/21/2018  9:00 AM Yordy Chávez, PT [facility]

## 2018-12-06 ENCOUNTER — HOSPITAL ENCOUNTER (OUTPATIENT)
Dept: PHYSICAL THERAPY | Age: 18
Discharge: HOME | End: 2018-12-06
Payer: COMMERCIAL

## 2018-12-06 PROCEDURE — 97140 MANUAL THERAPY 1/> REGIONS: CPT

## 2018-12-06 PROCEDURE — 97110 THERAPEUTIC EXERCISES: CPT

## 2018-12-06 NOTE — PROGRESS NOTES
PHYSICAL THERAPY - DAILY TREATMENT NOTE    Patient Name: Clarissa Murphy        Date: 2018  : 2000   YES Patient  Verified  Visit #:     Insurance: Payor: Alvaro Sicard / Plan: [address] [place] [address] / Product Type: PPO /      In time: 830 Out time: 930   Total Treatment Time: 60     Medicare Time Tracking (below)/BCBS   Total Timed Codes (min):   1:1 Treatment Time:       TREATMENT AREA =  Right shoulder pain [M25.511]    SUBJECTIVE  Pain Level (on 0 to 10 scale):  0  / 10   Medication Changes/New allergies or changes in medical history, any new surgeries or procedures? NO    If yes, update Summary List   Subjective Functional Status/Changes:  []  No changes reported     No new c/o. OBJECTIVE    40 min Therapeutic Exercise:  [x]  See flow sheet   Rationale:      increase ROM, increase strength and improve coordination to improve the patients ability to perform pain free ADLs.       20 min Manual Therapy: PROM elevation, IR, ER, BLH stretch, teres major release. [address] [facility]   Rationale:      decrease pain, increase ROM and increase tissue extensibility to improve patient's ability to perform pain free ADLs.        min Patient Education:  YES  Reviewed HEP   []  Progressed/Changed HEP based on: Other Objective/Functional Measures: Add SL ER 1# 15 x 5 seconds. Add: body blade 3 x 15 seconds. Post Treatment Pain Level (on 0 to 10) scale:   0  / 10     ASSESSMENT  Assessment/Changes in Function:     Good tolerance to all therx and new ones. Patient demonstrating increase scap and shoulder stability with all therx. []  See Progress Note/Recertification   Patient will continue to benefit from skilled PT services to modify and progress therapeutic interventions, address ROM deficits, address strength deficits, analyze and address soft tissue restrictions and analyze and cue movement patterns to attain remaining goals.    Progress toward goals / Updated goals:    Progressing steadily with goals    Anticipate DC in 3 more visits.        PLAN  []  Upgrade activities as tolerated YES Continue plan of care   []  Discharge due to :    []  Other:      Therapist: HANNA Marks    Date: 12/6/2018 Time: 6:06 AM       Future Appointments   Date Time Provider Carla Catalan   12/6/2018  8:30 AM Mehrdad Ramos PTA [facility]   12/7/2018  9:30 AM Darren Lu [facility]   12/11/2018  9:00 AM Mehrdad Ramos PTA [facility]   12/14/2018  9:30 AM Darren Lu [facility]   12/18/2018  9:00 AM Mehrdad Ramos PTA [facility]   12/21/2018  9:00 AM Ale Chávez, PT [facility]

## 2018-12-07 ENCOUNTER — HOSPITAL ENCOUNTER (OUTPATIENT)
Dept: PHYSICAL THERAPY | Age: 18
Discharge: HOME | End: 2018-12-07
Payer: COMMERCIAL

## 2018-12-07 PROCEDURE — 97140 MANUAL THERAPY 1/> REGIONS: CPT

## 2018-12-07 PROCEDURE — 97110 THERAPEUTIC EXERCISES: CPT

## 2018-12-07 NOTE — PROGRESS NOTES
PHYSICAL THERAPY - DAILY TREATMENT NOTE    Patient Name: Emmanuel Marcelino        Date: 2018  : 2000   YES Patient  Verified  Visit #:     Insurance: Payor: Gayla Stone / Plan: [address] [place] [address] / Product Type: PPO /      In time: 9:30 AM Out time: 10:37 AM   Total Treatment Time: 67     Medicare Time Tracking (below)/BCBS   Total Timed Codes (min):  67 1:1 Treatment Time:  45     TREATMENT AREA =  Right shoulder pain [M25.511]    SUBJECTIVE  Pain Level (on 0 to 10 scale):  0  / 10   Medication Changes/New allergies or changes in medical history, any new surgeries or procedures? NO    If yes, update Summary List   Subjective Functional Status/Changes:  []  No changes reported     Patient currently reports no new c/o R shoulder pain. States that she gets R thumb symptoms when performing       OBJECTIVE    30/57 min Therapeutic Exercise:  [x]  See flow sheet   Rationale:      increase ROM, increase strength and improve coordination to improve the patients ability to return to eventual sports activities.      15 min Manual Therapy: PROM elevation, IR, ER, BLH stretch, teres major release. [address] [facility]   Rationale:      decrease pain, increase ROM and increase tissue extensibility to improve patient's ability to perform pain free ADLs.        min Patient Education:  YES  Reviewed HEP   []  Progressed/Changed HEP based on:         Other Objective/Functional Measures:    1:1 TE + MT= 50'    TE per flowsheet     Post Treatment Pain Level (on 0 to 10) scale:   0  / 10     ASSESSMENT  Assessment/Changes in Function:     Quick to fatigue with scapular strengthening therex; req'd cues for scap mms activation and upright posture      []  See Progress Note/Recertification   Patient will continue to benefit from skilled PT services to modify and progress therapeutic interventions, address ROM deficits, address strength deficits, analyze and address soft tissue restrictions and analyze and cue movement patterns to attain remaining goals.    Progress toward goals / Updated goals:    D/C in 2 PT sessions  Good progress towards pain reduction goals       PLAN  []  Upgrade activities as tolerated YES Continue plan of care   []  Discharge due to :    []  Other:      Therapist: HANNA Pang    Date: 12/7/2018 Time: 11:35 AM       Future Appointments   Date Time Provider Carla Catalan   12/7/2018  9:30 AM Génesis Crowe [facility]   12/11/2018  9:00 AM Madhuri Adair PTA [facility]   12/14/2018  9:30 AM Jose David Lu [facility]   12/18/2018  9:00 AM Madhuri Adair PTA [facility]   12/21/2018  9:00 AM Sarah Chávez, PT [facility]

## 2018-12-11 ENCOUNTER — HOSPITAL ENCOUNTER (OUTPATIENT)
Dept: PHYSICAL THERAPY | Age: 18
Discharge: HOME | End: 2018-12-11
Payer: COMMERCIAL

## 2018-12-11 PROCEDURE — 97140 MANUAL THERAPY 1/> REGIONS: CPT

## 2018-12-11 PROCEDURE — 97110 THERAPEUTIC EXERCISES: CPT

## 2018-12-11 NOTE — PROGRESS NOTES
PHYSICAL THERAPY - DAILY TREATMENT NOTE    Patient Name: Elva Gaspar        Date: 2018  : 2000   YES Patient  Verified  Visit #:   10   of   12  Insurance: Payor: Shanti Flor / Plan: [address] [place] [address] / Product Type: PPO /      In time: 900 Out time: 1010   Total Treatment Time: 70     Medicare Time Tracking (below)/BCBS   Total Timed Codes (min):  70 1:1 Treatment Time:  30     TREATMENT AREA =  Right shoulder pain [M25.511]    SUBJECTIVE  Pain Level (on 0 to 10 scale):  0  / 10   Medication Changes/New allergies or changes in medical history, any new surgeries or procedures? NO    If yes, update Summary List   Subjective Functional Status/Changes:  []  No changes reported     Veto Romario wan played golf at [employer] and my shoulder did really good, no pain at all. OBJECTIVE    55 min Therapeutic Exercise:  [x]  See flow sheet   Rationale:      increase ROM, increase strength and improve coordination to improve the patients ability to perform pain free ADLs.       15 min Manual Therapy: PROM elevation, IR, ER, BLH stretch, teres major release. [address] [facility]   Rationale:      decrease pain, increase ROM and increase tissue extensibility to improve patient's ability to perform pain free ADLs.          min Patient Education:  YES  Reviewed HEP   []  Progressed/Changed HEP based on: Other Objective/Functional Measures:    Patient was shown sleeper stretch 3 x 15 seconds to be performed as part of HEP. Post Treatment Pain Level (on 0 to 10) scale:   0  / 10     ASSESSMENT  Assessment/Changes in Function:     Patient instructed to increase performance of supine scap stability with GTB.      []  See Progress Note/Recertification   Patient will continue to benefit from skilled PT services to modify and progress therapeutic interventions, address ROM deficits, address strength deficits, analyze and address soft tissue restrictions and analyze and cue movement patterns to attain remaining goals.   Progress toward goals / Updated goals:    DC next visit. Reassess LTGs.      PLAN  []  Upgrade activities as tolerated YES Continue plan of care   []  Discharge due to :    []  Other:      Therapist: HANNA Alvares    Date: 12/11/2018 Time: 6:02 AM       Future Appointments   Date Time Provider Carla Catalan   12/11/2018  9:00 AM Ching Mathur [facility]   12/14/2018  9:30 AM Adina Lu [facility]   12/18/2018  9:00 AM Miriam Rodriguez PTA [facility]   12/21/2018  9:00 AM Diane Chávez, PT [facility]

## 2018-12-14 ENCOUNTER — HOSPITAL ENCOUNTER (OUTPATIENT)
Dept: PHYSICAL THERAPY | Age: 18
Discharge: HOME | End: 2018-12-14
Payer: COMMERCIAL

## 2018-12-14 PROCEDURE — 97140 MANUAL THERAPY 1/> REGIONS: CPT

## 2018-12-14 PROCEDURE — 97110 THERAPEUTIC EXERCISES: CPT

## 2018-12-14 NOTE — PROGRESS NOTES
PHYSICAL THERAPY - DAILY TREATMENT NOTE    Patient Name: Serena Rodriguez        Date: 2018  : 2000   yes Patient  Verified  Visit #:     Insurance: Payor: Nadir Torrez / Plan: [address] [place] [address] / Product Type: PPO /      In time: 9:27 Out time: 10:35   Total Treatment Time: 68     Medicare/BCBS Time Tracking (below)   Total Timed Codes (min):  53 1:1 Treatment Time:  30     TREATMENT AREA =  Right shoulder pain [M25.511]    SUBJECTIVE  Pain Level (on 0 to 10 scale):  0  / 10   Medication Changes/New allergies or changes in medical history, any new surgeries or procedures?    no  If yes, update Summary List   Subjective Functional Status/Changes:  []  No changes reported     See d/c summary          OBJECTIVE      20  (43) min Therapeutic Exercise:  [x]  See flow sheet   Rationale:     increase ROM, increase strength and improve coordination to improve the patients ability to perform pain free ADLs.              10 min Manual Therapy: PROM elevation, IR, ER, BLH stretch, teres major release. [address] [facility]   Rationale:    decrease pain, increase ROM and increase tissue extensibility to improve patient's ability to perform pain free ADLs           min Patient Education:  yes  Reviewed HEP   []  Progressed/Changed HEP based on: Other Objective/Functional Measures:    See d/c     Post Treatment Pain Level (on 0 to 10) scale:   0  / 10     ASSESSMENT  Assessment/Changes in Function:     See d/c           Progress toward goals / Updated goals:    See d/c     PLAN  []  Upgrade activities as tolerated no Continue plan of care   []  Discharge due to :    []  Other:      Therapist: Jimmy Borges.  Young Jhaveri, PT    Date: 2018 Time: 9:04 AM     Future Appointments   Date Time Provider Carla Catalan   2018  9:30 AM Lizbeth Schmitz, PT [facility]   2018  9:00 AM Madhuri Adair, DOTTIE [facility]   2018  9:00 AM Sarah Chavis, PT [facility]

## 2018-12-14 NOTE — PROGRESS NOTES
[address], [address] [facility] - Phone: [phone]  Fax: [phone] SUMMARY  Patient Name: Burgess Willett : 2000   Treatment/Medical Diagnosis: Right shoulder pain [M25.511]   Referral Source: [facility], Not On File, MD     Date of Initial Visit: 2018 Attended Visits: 10 Missed Visits: 1     SUMMARY OF TREATMENT  Physical therapy has consisted of manual therapy for increased ROM, joint mobilizations, and decreased mm hypertonicity. Therapeutic exercises for scapular stabilization/shoudler strengthening to promote return to throw. Modalities provided PRN    CURRENT STATUS  Overall, pt has made fair progress achieving STG's and progressing towards LTG's. Pt rates overall improvement as 75% with functional activities since [facility]. Pt shows improvements in A/PROM to R shoulder, however not to LTG level to promote effective/efficient throwing mechanics. Shoulder flex R 160/165*; abd R 120/130*; ER R 82/90*; IR R 50/70*  Continues to note functional difficulty with donning bra, overhead movements/activities and has yet to return to throwing for softball; light toss and hitting activities resumed. Goal/Measure of Progress Goal Met? 1. Pt will improve FOTO score to >/= 75 to demo a significant improvement in functional activity tolerance. Status at last Eval: 67 Current Status: 71 progressing   2. Pt will achieve >/= +5 GROC in order to promote increased activity tolerance. Status at last Eval:  Current Status: +4 \"moderately better\" no   3. Pt will report decreased pain c/o to </= 1/10 to facilitate return to sport. Status at last Eval: 10/10 Current Status: 2/10 progressing     RECOMMENDATIONS  Other: d/c due to patient will be out of town for extended period of time    If you have any questions/comments please contact us directly at [phone].    Thank you for allowing us to assist in the care of your patient. Therapist Signature: Kell Nieto.  Chichi, WINDY Date: 12/14/18      Time: 9:06 AM

## 2018-12-18 ENCOUNTER — HOSPITAL ENCOUNTER (OUTPATIENT)
Dept: PHYSICAL THERAPY | Age: 18
End: 2018-12-18
Payer: COMMERCIAL

## 2018-12-28 ENCOUNTER — APPOINTMENT (OUTPATIENT)
Dept: PHYSICAL THERAPY | Age: 18
End: 2018-12-28
Payer: COMMERCIAL

## 2019-01-17 ENCOUNTER — HOSPITAL ENCOUNTER (OUTPATIENT)
Dept: PHYSICAL THERAPY | Age: 19
Discharge: HOME | End: 2019-01-17
Payer: COMMERCIAL

## 2019-01-17 PROCEDURE — 97162 PT EVAL MOD COMPLEX 30 MIN: CPT

## 2019-01-17 PROCEDURE — 97110 THERAPEUTIC EXERCISES: CPT

## 2019-01-17 NOTE — PROGRESS NOTES
PHYSICAL THERAPY - DAILY TREATMENT NOTE Patient Name: Cristy Vogel        Date: 2019 : 2000   yes Patient  Verified Visit #:   1     Insurance: Payor: BLUE CROSS / Plan: [address] [place] [address] / Product Type: PPO / In time: 8:52 Out time: 9:38 Total Treatment Time: 46 Medicare/BCBS Time Tracking (below) Total Timed Codes (min):  46 1:1 Treatment Time:  46 TREATMENT AREA =  Pain in right shoulder [M25.511] SUBJECTIVE Pain Level (on 0 to 10 scale):  0  / 10 Medication Changes/New allergies or changes in medical history, any new surgeries or procedures?    no  If yes, update Summary List  
Subjective Functional Status/Changes:  []  No changes reported See POC OBJECTIVE See exam on chart for details on objective findings 15 min Therapeutic Exercise:  [x]  See flow sheet Rationale:      increase ROM and increase strength to improve the patients ability to return to throwing Billed With/As: 
 [x] TE 
 [] TA 
 [] Neuro 
 [] Self Care Patient Education: [x] Review HEP [] Progressed/Changed HEP based on:  
[] positioning   [] body mechanics   [] transfers   [] heat/ice application   
[] other:   
Other Objective/Functional Measures: 
 
See POC Post Treatment Pain Level (on 0 to 10) scale:   0  / 10 ASSESSMENT Assessment/Changes in Function:  
 
See POC []  See Progress Note/Recertification Patient will continue to benefit from skilled PT services to modify and progress therapeutic interventions, address ROM deficits, address strength deficits, analyze and address soft tissue restrictions, analyze and cue movement patterns and analyze and modify body mechanics/ergonomics to attain remaining goals. Progress toward goals / Updated goals: 
See POC PLAN 
[]  Upgrade activities as tolerated yes Continue plan of care  
[]  Discharge due to :   
[]  Other:   
 
Therapist: Fred Cadet, PT Date: 2019 Time: 7:53 AM  
 
 Future Appointments Date Time Provider Carla Catalna 1/22/2019  7:00 AM Мария Sutherland PTA [facility]  
1/24/2019  3:00 PM Jayden Mcelroy [facility]  
1/29/2019  4:00 PM Melchor, Genevie Saint [facility]  
1/31/2019  2:00 PM Blayne Armstrong PT [facility]

## 2019-01-17 NOTE — PROGRESS NOTES
[address] PHYSICAL THERAPY 
[address] [place] [address] 51Manjinder Asa 201,Melanie Murillo, [address] [facility] [address] - Phone: [phone]  Fax: [phone] PLAN OF CARE / STATEMENT OF MEDICAL NECESSITY FOR PHYSICAL THERAPY SERVICES Patient Name: Amadou Romero : 2000 Medical  
Diagnosis: Pain in right shoulder [M25.511] Treatment Diagnosis: Pain in right shoulder [M25.511] Onset Date: 2018 Referral Source: Khang Long MD [facility]): 2019 Prior Hospitalization: See medical history Provider #: [id number] Prior Level of Function: Able to pitch softball at college level pain free Comorbidities: R labral repair 2018, R KAYY 2018 Medications: Verified on Patient Summary List  
The Plan of Care and following information is based on the information from the initial evaluation.  
=========================================================================================== Assessment / key information:  Pt is a 19y.o. year old RHD female with subjective complaints of R shoulder pain stemming from injury in 2018 leading to R labral repair, SAD on 2018, and KAYY 18. Pt has been seen at our clinic extensively with most recent d/c on 18 as she was returning home during school break. She did continue working with PT in [place] with focus on return to throwing. She had worked up to full intensity pitching activity for ~ 1 week before onset of increased shoulder pain with noting numbness/weakness which extends over lateral arm into hand. She is unable to pitch without immediate onset of sx's at this point. Current pain is rated as 0 to 10/10; and described as \"sharp, burning, numbness, and weakness\". Current functional limitations: throwing, unable to sleep on R side. FOTO score= 74/100 indicating 26% impairment to functional activities. Today's evaulation is significant for: FHFS posture with ant scap tipping. Poor scapulo-humeral mechanics with early/excessive post tipping scapula for OH motions, UT activation at 70 deg of abduction. Myotomes/dermatomes intact, though pt reports sx's extend along C5-C6 dermatomes with primary c/o to biceps region, and thumb in throwing. Tightness to UT/SCM R. TTP to c/s paraspinals, UT, SCM. 
(+) Special tests: Lift off 
(-) cervical screen; AROM wnl all planes and pain free. (-) Spurlings, compression/distraction. ULTT's are abnormal with pt reporting sx's initial test position for median n. Which reduces with wrist flexion, however no change with progressive elbow extension and pt reports increased sx's with wrist extension in end test position. ULTT B with pt reporting sx's at end range, but no change with wrist ext. (-) Phalen, reverse Phalen, Tinnels. Dynamometer R 16.6 kg, L 17.3 kg. PPSU                              PROM                   Strength (1-5) Shoulder Left Right Left  Right Left  Right Flexion * * 4 3+ Abduction  120*  129* 4 3+ Internal Rotation 
(@) 90 deg. Abd  48*  55* 4+ 4 External Rotation 
(@) 90 deg. Abd  71*  77* 4 4- Functional IR behind back:  R T10, L T8 These findings are supportive for diagnosis of R shoulder pain with peripheral nerve irritation to C5-C6. Pt has maintained flexion/abd motion, however reduced ER/IR motions since last d/c and available AROM is not sufficient to promote reutrn to sport activity at this time. Pt was ed on need to d/c throwing and resume HEP to address flexibility/strength deficits and reduce neural irritation. Pt will be a good candidate for skilled PT to address these impairments and promote return to normal ADLs and functional mobility for improved quality of life. 
=========================================================================================== Eval Complexity: History MEDIUM  Complexity : 1-2 comorbidities / personal factors will impact the outcome/ POC ;  Examination  MEDIUM Complexity : 3 Standardized tests and measures addressing body structure, function, activity limitation and / or participation in recreation ; Presentation MEDIUM Complexity : Evolving with changing characteristics ; Decision Making MEDIUM Complexity : FOTO score of 26-74; Overall Complexity MEDIUM Problem List: pain affecting function, decrease ROM, decrease strength, decrease ADL/ functional abilitiies, decrease activity tolerance and decrease flexibility/ joint mobility Treatment Plan may include any combination of the following: Therapeutic exercise, Therapeutic activities, Neuromuscular re-education, Physical agent/modality, Manual therapy and Patient education Patient / Family readiness to learn indicated by: asking questions, trying to perform skills and interest 
Persons(s) to be included in education: patient (P) Barriers to Learning/Limitations: None Measures taken, if barriers to learning:   
Patient Goal (s): \"I hope to be able to throw w/o weakness in my arm\" Patient self reported health status: excellent Rehabilitation Potential: good · Short Term Goals: To be accomplished in  2  weeks: 1. Pt will be educated in appropriate HEP to decrease pain, increase ROM, increase strength and return pt to PLOF.   
2. Pt will increase R shoulder flexion AROM by >/= 10 degrees in order to promote normal pitching mechanics. 3. Pt will increase R shoulder ER by >/= 10 degrees to promote return to pitching. 4. Pt will be able to tolerate R side lying to promote uninterrupted sleep. 
  
· Long Term Goals: To be accomplished in  4-6  weeks: 1. Pt will improve FOTO score to >/= 75 to demo a significant improvement in functional activity tolerance. 2. Pt will achieve >/= +5 GROC in order to promote increased activity tolerance. 3. Pt will increase R shoulder IR/ER strength by 1/3 MMT to promote return to throwing activity. Frequency / Duration:   Patient to be seen  2  times per week for 4-6  weeks: 
Patient / Caregiver education and instruction: activity modification and exercises Therapist Signature: Irma Cadet PT Date: 1/17/2019 Certification Period: n/a Time: 8:32 AM  
=========================================================================================== I certify that the above Physical Therapy Services are being furnished while the patient is under my care. I agree with the treatment plan and certify that this therapy is necessary. Physician Signature:       Date:      Time:  Please sign and return to [facility] [place]. or you may fax the signed copy to [phone]. Thank you.

## 2019-01-22 ENCOUNTER — APPOINTMENT (OUTPATIENT)
Dept: PHYSICAL THERAPY | Age: 19
End: 2019-01-22
Payer: COMMERCIAL

## 2019-01-24 ENCOUNTER — HOSPITAL ENCOUNTER (OUTPATIENT)
Dept: PHYSICAL THERAPY | Age: 19
Discharge: HOME | End: 2019-01-24
Payer: COMMERCIAL

## 2019-01-24 PROCEDURE — 97110 THERAPEUTIC EXERCISES: CPT | Performed by: PHYSICAL THERAPIST

## 2019-01-24 PROCEDURE — 97140 MANUAL THERAPY 1/> REGIONS: CPT | Performed by: PHYSICAL THERAPIST

## 2019-01-24 NOTE — PROGRESS NOTES
Casimiro Kiran PHYSICAL THERAPY - DAILY TREATMENT NOTE Patient Name: Noé Acuña        Date: 2019 : 2000   yes Patient  Verified Visit #:   2     Insurance: Payor: BLUE CROSS / Plan: [address] [place] [address] / Product Type: PPO / In time: 300 Out time: 340 Total Treatment Time: 40 Medicare/BCBS Time Tracking (below) Total Timed Codes (min):  40 1:1 Treatment Time:  40 TREATMENT AREA =  Right shoulder pain [M25.511] SUBJECTIVE Pain Level (on 0 to 10 scale):  0  / 10 Medication Changes/New allergies or changes in medical history, any new surgeries or procedures? yes  If yes, update Summary List  
Subjective Functional Status/Changes:  []  No changes reported My  came back and I started throwing underhand and I could only make 1/2 circles before arm pain. Overhand I start throwing and there is pain all through my shoulder and I get numbness down my arm OBJECTIVE 15 min Therapeutic Exercise:  [x]  See flow sheet Rationale:      increase ROM and increase strength to improve the patients ability to return to sport 25 min Manual Therapy: GHJ prom all directions, ir/er stretch, post cuff release, dtm anterior shoulder mid deltoid, grade iii post mob Rationale:      decrease pain, increase ROM, increase tissue extensibility and decrease trigger points to improve patient's ability to return to sport Billed With/As: 
 [x] TE 
 [] TA 
 [] Neuro 
 [] Self Care Patient Education: [x] Review HEP [] Progressed/Changed HEP based on:  
[x] positioning   [x] body mechanics   [] transfers   [x] heat/ice application   
[x] other: avoid throwing Other Objective/Functional Measures: 
 
Significant limitation in rtc extensibility as well as post capsule mobility, 90 degree abd and approximately 39 er with any passive movement greater than this eliciting UE sx 
ttp throughout post cuff - performed te as per flow sheet Post Treatment Pain Level (on 0 to 10) scale:   0  / 10 ASSESSMENT Assessment/Changes in Function:  
 
Pt reports in engaging in unsupervised training 2x/week and throwing 4x week - advised to cease throwing and any overhead motions with lifting - dmeo verbal understanding  
  
[]  See Progress Note/Recertification Patient will continue to benefit from skilled PT services to modify and progress therapeutic interventions, address functional mobility deficits, address ROM deficits, address strength deficits, analyze and address soft tissue restrictions, analyze and cue movement patterns, analyze and modify body mechanics/ergonomics, assess and modify postural abnormalities and instruct in home and community integration to attain remaining goals. Progress toward goals / Updated goals: 
Continues with rom restrictions limiting ability to throw PLAN 
[]  Upgrade activities as tolerated yes Continue plan of care  
[]  Discharge due to :   
[]  Other:   
 
Therapist: Negrito Cortez, PT Date: 1/24/2019 Time: 3:30 PM  
 
Future Appointments Date Time Provider Carla Catalan 1/29/2019  4:00 PM Bandar Mane [facility]  
1/31/2019  2:00 PM Brie Hatfield PT [facility]

## 2019-01-29 ENCOUNTER — HOSPITAL ENCOUNTER (OUTPATIENT)
Dept: PHYSICAL THERAPY | Age: 19
Discharge: HOME | End: 2019-01-29
Payer: COMMERCIAL

## 2019-01-29 PROCEDURE — 97140 MANUAL THERAPY 1/> REGIONS: CPT

## 2019-01-29 PROCEDURE — 97110 THERAPEUTIC EXERCISES: CPT

## 2019-01-29 NOTE — PROGRESS NOTES
Jennifer Pham PHYSICAL THERAPY - DAILY TREATMENT NOTE Patient Name: Karyle Guy        Date: 2019 : 2000   yes Patient  Verified Visit #:   3     Insurance: Payor: BLUE CROSS / Plan: [address] [place] [address] / Product Type: PPO / In time: 3:57 PM Out time: 4:50 PM  
Total Treatment Time: 48 Medicare/[facility] Time Tracking (below) Total Timed Codes (min):  53 1:1 Treatment Time:  40 TREATMENT AREA =  Right shoulder pain [M25.511] SUBJECTIVE Pain Level (on 0 to 10 scale):  0  / 10 Medication Changes/New allergies or changes in medical history, any new surgeries or procedures? yes  If yes, update Summary List  
Subjective Functional Status/Changes:  []  No changes reported Pt currently reports no pain in the R shoulder today. States that she has been compliant on not throwing a softball, however has been noncompliant with her current HEP due to school work. OBJECTIVE 
25/38 min Therapeutic Exercise:  [x]  See flow sheet Rationale:      increase ROM and increase strength to improve the patients ability to return to eventual sport 15 min Manual Therapy: Contract/relax (R) IR/ER stretch; (R) pec and post cuff release; DTM to anterior shoulder mid deltoid Rationale:      decrease pain, increase ROM, increase tissue extensibility and decrease trigger points to improve patient's ability to promote uninterrupted sleep Billed With/As: 
 [x] TE 
 [] TA 
 [] Neuro 
 [] Self Care Patient Education: [x] Review HEP [] Progressed/Changed HEP based on:  
[x] positioning   [x] body mechanics   [] transfers   [x] heat/ice application   
[x] other: avoid throwing Other Objective/Functional Measures: 
 
1:1 TE + MT = 40' Added multiple stretches and scapular strengthening therex to improve pt's return to pitching (see flowsheet) Able to inc passive ER with contract/relax technique, however cont to note pain and UE sx at passive approx 45 deg of ER  
  
 Post Treatment Pain Level (on 0 to 10) scale:  0  / 10 ASSESSMENT Assessment/Changes in Function:  
 
Challenged with maintaining 90/90 shoulder position during standing TB IR; able to perform therex in seated position with cues for proper ms activation. Able to perform remaining therex without inc pain, however noted quick to fatigue due to overall weakness 
  
[]  See Progress Note/Recertification Patient will continue to benefit from skilled PT services to modify and progress therapeutic interventions, address functional mobility deficits, address ROM deficits, address strength deficits, analyze and address soft tissue restrictions, analyze and cue movement patterns, analyze and modify body mechanics/ergonomics, assess and modify postural abnormalities and instruct in home and community integration to attain remaining goals. Progress toward goals / Updated goals: No significant progress towards STGs during today's PT session Will continue to progress therex per pt's tolerance and monitor pain levels PLAN 
[]  Upgrade activities as tolerated yes Continue plan of care  
[]  Discharge due to :   
[]  Other:   
 
Therapist: HANNA Webster Date: 1/29/2019 Time: 6:29 PM  
 
Future Appointments Date Time Provider Carla Catalan 1/29/2019  4:00 PM Snehal Enrique [facility]  
1/31/2019  2:00 PM Bisi Lerma, PT [facility]

## 2019-01-31 ENCOUNTER — HOSPITAL ENCOUNTER (OUTPATIENT)
Dept: PHYSICAL THERAPY | Age: 19
Discharge: HOME | End: 2019-01-31
Payer: COMMERCIAL

## 2019-01-31 PROCEDURE — 97140 MANUAL THERAPY 1/> REGIONS: CPT | Performed by: PHYSICAL THERAPIST

## 2019-01-31 PROCEDURE — 97110 THERAPEUTIC EXERCISES: CPT | Performed by: PHYSICAL THERAPIST

## 2019-01-31 NOTE — PROGRESS NOTES
Ronn Madsen PHYSICAL THERAPY - DAILY TREATMENT NOTE Patient Name: Yossi Rivera        Date: 2019 : 2000   yes Patient  Verified Visit #:   4     Insurance: Payor: BLUE CROSS / Plan: [address] [place] [address] / Product Type: PPO / In time: 200 Out time: 200 Total Treatment Time: 52 Medicare/[facility] Time Tracking (below) Total Timed Codes (min):  47 1:1 Treatment Time:  35 TREATMENT AREA =  Right shoulder pain [M25.511] SUBJECTIVE Pain Level (on 0 to 10 scale):  0  / 10 Medication Changes/New allergies or changes in medical history, any new surgeries or procedures? yes  If yes, update Summary List  
Subjective Functional Status/Changes:  []  No changes reported Just a little sore after last time. I just got back from practice too - did nothing throwing. OBJECTIVE 22 min Therapeutic Exercise:  [x]  See flow sheet Rationale:      increase ROM and increase strength to improve the patients ability to return to sport 25 min Manual Therapy: GHJ prom all directions, ir/er stretch in supine and prone, post cuff release, dtm anterior shoulder mid deltoid, grade iii post mob Rationale:      decrease pain, increase ROM, increase tissue extensibility and decrease trigger points to improve patient's ability to return to sport Billed With/As: 
 [x] TE 
 [] TA 
 [] Neuro 
 [] Self Care Patient Education: [x] Review HEP [] Progressed/Changed HEP based on:  
[x] positioning   [x] body mechanics   [] transfers   [x] heat/ice application   
[x] other: avoid throwing Other Objective/Functional Measures: 
Ir in prone limited by 50% before onset of posterior lateral pain - significantly limiting overhead follow through Sidelying er limited to approximately 25 degrees before onset of pain I so performed with therapist r/s with reduced UT recruitment 1:1 2036 Post Treatment Pain Level (on 0 to 10) scale:   0  / 10 ASSESSMENT Assessment/Changes in Function: Continues with significant compensations to achieve rotational motions creating impingement like sx  
   
[]  See Progress Note/Recertification Patient will continue to benefit from skilled PT services to modify and progress therapeutic interventions, address functional mobility deficits, address ROM deficits, address strength deficits, analyze and address soft tissue restrictions, analyze and cue movement patterns, analyze and modify body mechanics/ergonomics, assess and modify postural abnormalities and instruct in home and community integration to attain remaining goals. Progress toward goals / Updated goals: 
Pt has been compliant with activity modification and denies any pain with adls but still unable to perform sports specific te due to posterior lateral pain PLAN 
[]  Upgrade activities as tolerated yes Continue plan of care  
[]  Discharge due to :   
[]  Other:   
 
Therapist: Monserrat Rodriges PT Date: 1/31/2019 Time: 3:30 PM  
 
Future Appointments Date Time Provider Carla Catalan 1/31/2019  2:00 PM Eli Chávez, PT [facility]

## 2019-02-08 ENCOUNTER — HOSPITAL ENCOUNTER (OUTPATIENT)
Dept: PHYSICAL THERAPY | Age: 19
Discharge: HOME | End: 2019-02-08
Payer: COMMERCIAL

## 2019-02-08 PROCEDURE — 97110 THERAPEUTIC EXERCISES: CPT | Performed by: PHYSICAL THERAPIST

## 2019-02-08 PROCEDURE — 97140 MANUAL THERAPY 1/> REGIONS: CPT | Performed by: PHYSICAL THERAPIST

## 2019-02-08 NOTE — PROGRESS NOTES
Eliza Braden PHYSICAL THERAPY - DAILY TREATMENT NOTE Patient Name: Donita Sam        Date: 2019 : 2000   yes Patient  Verified Visit #:   5     Insurance: Payor: BLUE CROSS / Plan: [address] [place] [address] / Product Type: PPO / In time: 930 Out time: 175 Total Treatment Time: 72 Medicare/[facility] Time Tracking (below) Total Timed Codes (min):  55 1:1 Treatment Time:  55 TREATMENT AREA =  Right shoulder pain [M25.511] SUBJECTIVE Pain Level (on 0 to 10 scale):  0  / 10 Medication Changes/New allergies or changes in medical history, any new surgeries or procedures? yes  If yes, update Summary List  
Subjective Functional Status/Changes:  []  No changes reported It just feels achey all the time OBJECTIVE Modalities Rationale:     decrease pain to improve patient's ability to perform ADLs.   min [] Estim, type/location:                          
                                 []  att     []  unatt     []  w/US     []  w/ice    []  w/heat  
  min []  Mechanical Traction: type/lbs    
               []  pro   []  sup   []  int   []  cont    []  before manual    []  after manual  
  min []  Ultrasound, settings/location:     
  min []  Iontophoresis w/ dexamethasone, location:    
                                           []  take home patch       []  in clinic  
10 min [x]  Ice     []  Heat    location/position: Long sit  
  min []  Vasopneumatic Device, press/temp:    
  min []  Other:    
  
 
30 min Therapeutic Exercise:  [x]  See flow sheet Rationale:      increase ROM and increase strength to improve the patients ability to return to sport 25 min Manual Therapy: GHJ prom all directions, ir/er stretch in supine and prone, post cuff release, dtm anterior shoulder mid deltoid, grade iii post mob, ir/er r/s in sidelying Rationale:      decrease pain, increase ROM, increase tissue extensibility and decrease trigger points to improve patient's ability to return to sport Billed With/As: 
 [x] TE 
 [] TA 
 [] Neuro 
 [] Self Care Patient Education: [x] Review HEP [] Progressed/Changed HEP based on:  
[x] positioning   [x] body mechanics   [] transfers   [x] heat/ice application   
[x] other: avoid throwing Other Objective/Functional Measures: 
Pt arrived to session with  - all 3 participants discussed returned to throwing program. All agreed to cease throwing outside of clinic and modified current lifting program ( brought in) to avoid overhead lifting. Still continues to have pain at attempting er >45 at 90 degrees with reports posterior lateral pain . Progressed te as per flow sheet Post Treatment Pain Level (on 0 to 10) scale:   0  / 10 ASSESSMENT Assessment/Changes in Function:  
Pt still continues to lack rom/strength in ranges required to throw. []  See Progress Note/Recertification Patient will continue to benefit from skilled PT services to modify and progress therapeutic interventions, address functional mobility deficits, address ROM deficits, address strength deficits, analyze and address soft tissue restrictions, analyze and cue movement patterns, analyze and modify body mechanics/ergonomics, assess and modify postural abnormalities and instruct in home and community integration to attain remaining goals. Progress toward goals / Updated goals: Will perform FOTO next session in order to assess progress towards established goal  
 
PLAN 
[]  Upgrade activities as tolerated yes Continue plan of care  
[]  Discharge due to :   
[]  Other:   
 
Therapist: Cuco Grady, PT Date: 2/8/2019 Time: 3:30 PM  
 
Future Appointments Date Time Provider Carla Catalan 2/8/2019  9:30 AM Andressa Chávez, PT [id number]

## 2019-02-20 ENCOUNTER — HOSPITAL ENCOUNTER (OUTPATIENT)
Dept: PHYSICAL THERAPY | Age: 19
Discharge: HOME | End: 2019-02-20
Payer: COMMERCIAL

## 2019-02-20 PROCEDURE — 97110 THERAPEUTIC EXERCISES: CPT | Performed by: PHYSICAL THERAPIST

## 2019-02-20 PROCEDURE — 97140 MANUAL THERAPY 1/> REGIONS: CPT | Performed by: PHYSICAL THERAPIST

## 2019-02-20 NOTE — PROGRESS NOTES
Kermit Recinos PHYSICAL THERAPY - DAILY TREATMENT NOTE Patient Name: Elsa Brooks        Date: 2019 : 2000   yes Patient  Verified Visit #:   6     Insurance: Payor: BLUE CROSS / Plan: [address] [place] [address] / Product Type: PPO / In time: 255 Out time: 355 Total Treatment Time: 60 Medicare/[facility] Time Tracking (below) Total Timed Codes (min):  50 1:1 Treatment Time:  50 TREATMENT AREA =  Right shoulder pain [M25.511] SUBJECTIVE Pain Level (on 0 to 10 scale):  0  / 10 Medication Changes/New allergies or changes in medical history, any new surgeries or procedures? yes  If yes, update Summary List  
Subjective Functional Status/Changes:  []  No changes reported See pn OBJECTIVEModalities Rationale:     decrease pain to improve patient's ability to perform ADLs.   min [] Estim, type/location:                          
                                 []  att     []  unatt     []  w/US     []  w/ice    []  w/heat  
  min []  Mechanical Traction: type/lbs    
               []  pro   []  sup   []  int   []  cont    []  before manual    []  after manual  
  min []  Ultrasound, settings/location:     
  min []  Iontophoresis w/ dexamethasone, location:    
                                           []  take home patch       []  in clinic  
10 min [x]  Ice     []  Heat    location/position: Long sit  
  min []  Vasopneumatic Device, press/temp:    
  min []  Other:    
  
 
30 min Therapeutic Exercise:  [x]  See flow sheet Rationale:      increase ROM and increase strength to improve the patients ability to return to sport 20 min Manual Therapy: GHJ prom all directions, ir/er stretch in supine and prone, post cuff release, dtm anterior shoulder mid deltoid, grade iii post mob, ir/er r/s in sidelying, grade iii pa mobs t3-7 Rationale:      decrease pain, increase ROM, increase tissue extensibility and decrease trigger points to improve patient's ability to return to sport Billed With/As: 
 [x] TE 
 [] TA 
 [] Neuro 
 [] Self Care Patient Education: [x] Review HEP [] Progressed/Changed HEP based on:  
[x] positioning   [x] body mechanics   [] transfers   [x] heat/ice application   
[x] other: avoid throwing Other Objective/Functional Measures: 
See pn  
  
Post Treatment Pain Level (on 0 to 10) scale:   0  / 10 ASSESSMENT Assessment/Changes in Function:  
See pn  
  
[]  See Progress Note/Recertification Patient will continue to benefit from skilled PT services to modify and progress therapeutic interventions, address functional mobility deficits, address ROM deficits, address strength deficits, analyze and address soft tissue restrictions, analyze and cue movement patterns, analyze and modify body mechanics/ergonomics, assess and modify postural abnormalities and instruct in home and community integration to attain remaining goals. Progress toward goals / Updated goals: 
See pn   
 
PLAN 
[]  Upgrade activities as tolerated yes Continue plan of care  
[]  Discharge due to :   
[]  Other:   
 
Therapist: Ora Waddell PT Date: 2/20/2019 Time: 3:30 PM  
 
Future Appointments Date Time Provider Carla Catalan 2/27/2019  9:00 AM Aniket Lewis, PT [facility]  
2/28/2019 11:00 AM Aniket Lewis, PT [address] [place] [address]  
3/5/2019 10:30 AM Aniket Lewis, PT [facility]  
3/7/2019 10:00 AM Aniket Lewis, PT [facility]  
3/12/2019 10:00 AM Aniket Lewis, PT [address] [place] [address]  
3/14/2019 10:30 AM Aniket Lewis, PT [facility]  
3/19/2019  3:00 PM Chestine [facility]  
3/21/2019  9:00 AM Aniket Lewis, PT [facility]  
3/26/2019 10:00 AM Aniket Lewis, PT [facility]  
3/28/2019  9:00 AM Yareli Chávez, PT [facility]

## 2019-02-20 NOTE — PROGRESS NOTES
.[facility] [address] [facility] THERAPY 
[address] [place], [place] [address],[facility], [facility] - Phone: [phone]  Fax: [phone] PROGRESS NOTE Patient Name: Carroll Daniels : 2000 Treatment/Medical Diagnosis: Right shoulder pain [M25.511] Referral Source: Sushma Jeronimo MD    
Date of Initial Visit: 19 Attended Visits: 6 Missed Visits: See below SUMMARY OF TREATMENT Pt was seen for IE and 5 f/u visits since over last 5 weeks with treatment consisting of therapeutic exercises for shoulder rom/leland-scapular stability, manual therapy (prom/mobs/stm) and modalities prn. In addition pt was instructed on hep CURRENT STATUS Pt has made slow progress with PT in the past month of PT. She arrived to our facility from her college break with significant loss of motion and pain that she did not previously have when we saw her. This past month has emphasized restoring pain free rom prior to return to throwing. Shoulder arom flex 168, abduction 140, ER at 90 76, IR 57. +posterior and inferior capsule tightness, latissimus, subscapularis and pectoralis tightness. We have spoken at length with her  on modifying exercise program as that was delaying progress and increasing pain. Since compliance max pain levels 1/10 at worst.  
 
Goal/Measure of Progress Goal Met? 1. Pt will increase FOTO score >/=75/100 to demonstrate a significant improvement in functional activity tolerance Status at last Eval: 74/100 Current Status: 76/100 yes 2. Pt will achieve >/=+5 on GROC in order to promote increased activity tolerance Status at last Eval: na Current Status: +3 progessing 3. Pt will increase R shoulder IR/ER strength by 1/3 MMT to promote return to throwing program  
Status at last Eval: IR 4/5, ER 4-/5 Current Status: IR/ER 4/5 progressing New Goals to be achieved in __4__  weeks: 1. Achieve goal #2 2. Achieve goal #3 3. Pt will initiate return to overhead throwing program  
RECOMMENDATIONS Continue with therapy an additional 2x/ 4 weeks If you have any questions/comments please contact us directly at [phone]. Thank you for allowing us to assist in the care of your patient. Therapist Signature: Dave Saucedo DPT, [facility]  Date: 2/20/2019 Time: 4:17 PM  
NOTE TO PHYSICIAN:  PLEASE COMPLETE THE ORDERS BELOW AND FAX TO [facility] Physical Therapy: [phone] If you are unable to process this request in 24 hours please contact our office: [phone] 
 
___ I have read the above report and request that my patient continue as recommended.  
___ I have read the above report and request that my patient continue therapy with the following changes/special instructions:_________________________________________________________  
___ I have read the above report and request that my patient be discharged from therapy.   
 
Physician Signature:       Date:      Time:

## 2019-02-27 ENCOUNTER — HOSPITAL ENCOUNTER (OUTPATIENT)
Dept: PHYSICAL THERAPY | Age: 19
Discharge: HOME | End: 2019-02-27
Payer: COMMERCIAL

## 2019-02-27 PROCEDURE — 97140 MANUAL THERAPY 1/> REGIONS: CPT | Performed by: PHYSICAL THERAPIST

## 2019-02-27 PROCEDURE — 97110 THERAPEUTIC EXERCISES: CPT | Performed by: PHYSICAL THERAPIST

## 2019-02-27 NOTE — PROGRESS NOTES
[facility] PHYSICAL [facility]ERAPY - DAILY TREATMENT NOTE Patient Name: Shruti Butts        Date: 2019 : 2000   yes Patient  Verified Visit #:   7     Insurance: Payor: BLUE CROSS / Plan: [address] [place] [address] / Product Type: PPO / In time: 855 Out time: 950 Total Treatment Time: 54 Medicare/[facility] Time Tracking (below) Total Timed Codes (min):  55 1:1 Treatment Time:  55 TREATMENT AREA =  Right shoulder pain [M25.511] SUBJECTIVE Pain Level (on 0 to 10 scale):  0  / 10 Medication Changes/New allergies or changes in medical history, any new surgeries or procedures? yes  If yes, update Summary List  
Subjective Functional Status/Changes:  []  No changes reported I was at practice and my  forgot that I have shoulder pain and she made me throw a ball and I had pain. OBJECTIVE 40 min Therapeutic Exercise:  [x]  See flow sheet Rationale:      increase ROM and increase strength to improve the patients ability to return to sport 15 min Manual Therapy: GHJ prom all directions, ir/er stretch in supine and prone, post cuff release, dtm anterior shoulder mid deltoid, grade iii post mob, ir/er r/s in sidelying, grade iii pa mobs t3-7 Rationale:      decrease pain, increase ROM, increase tissue extensibility and decrease trigger points to improve patient's ability to return to sport Billed With/As: 
 [x] TE 
 [] TA 
 [] Neuro 
 [] Self Care Patient Education: [x] Review HEP [] Progressed/Changed HEP based on:  
[x] positioning   [x] body mechanics   [] transfers   [x] heat/ice application   
[x] other: avoid throwing Other Objective/Functional Measures: 
 
Prone 90 er limited by 25 degrees supine by 30 with excessive thoracic rotation and ext with c/o posterior lateral pain with attempts any further Requires cues for mid-trap activation but once cued able to achieve without weight Post Treatment Pain Level (on 0 to 10) scale:   0  / 10 ASSESSMENT Assessment/Changes in Function:  
 
 
Continues to lack end range rotation appropriate enough for throwing. Pt was advised to increase frequency of hep stretching and emailed copy   
[]  See Progress Note/Recertification Patient will continue to benefit from skilled PT services to modify and progress therapeutic interventions, address functional mobility deficits, address ROM deficits, address strength deficits, analyze and address soft tissue restrictions, analyze and cue movement patterns, analyze and modify body mechanics/ergonomics, assess and modify postural abnormalities and instruct in home and community integration to attain remaining goals. Progress toward goals / Updated goals: Will continue therapy to address rom to return to throw program  
 
 
PLAN 
[]  Upgrade activities as tolerated yes Continue plan of care  
[]  Discharge due to :   
[]  Other:   
 
Therapist: Vanessa Eric PT Date: 2/27/2019 Time: 3:30 PM  
 
Future Appointments Date Time Provider Carla Catalan 2/27/2019  9:00 AM Octavia Garner, PT [facility]  
2/28/2019 11:00 AM Octavia Garner PT [facility]  
3/5/2019 10:30 AM Octavia Garner PT [facility]  
3/7/2019 10:00 AM Octavia Garner, PT [facility]  
3/12/2019 10:00 AM Octavia Garner PT [facility]  
3/14/2019 10:30 AM Octavia Garner, PT [facility]  
3/19/2019  3:00 PM [employer] [place] [facility]  
3/21/2019  9:00 AM Octavia Garner PT [facility]  
3/26/2019 10:00 AM Octavia Garner PT [facility]  
3/28/2019  9:00 AM Gloria Chávez, PT [facility]

## 2019-02-28 ENCOUNTER — HOSPITAL ENCOUNTER (OUTPATIENT)
Dept: PHYSICAL THERAPY | Age: 19
Discharge: HOME | End: 2019-02-28
Payer: COMMERCIAL

## 2019-02-28 PROCEDURE — 97140 MANUAL THERAPY 1/> REGIONS: CPT | Performed by: PHYSICAL THERAPIST

## 2019-02-28 PROCEDURE — 97110 THERAPEUTIC EXERCISES: CPT | Performed by: PHYSICAL THERAPIST

## 2019-02-28 NOTE — PROGRESS NOTES
[facility] PHYSICAL THERAPY - DAILY TREATMENT NOTE Patient Name: Danita Zabala        Date: 2019 : 2000   yes Patient  Verified Visit #:   8     Insurance: Payor: BLUE CROSS / Plan: [address] [place] [address] / Product Type: PPO / In time:  Out time:  Total Treatment Time: 46 Medicare/BCBS Time Tracking (below) Total Timed Codes (min):  52 1:1 Treatment Time:  46 TREATMENT AREA =  Right shoulder pain [M25.511] SUBJECTIVE Pain Level (on 0 to 10 scale):  0  / 10 Medication Changes/New allergies or changes in medical history, any new surgeries or procedures? yes  If yes, update Summary List  
Subjective Functional Status/Changes:  []  No changes reported No pain or soreness after last session OBJECTIVE 40 min Therapeutic Exercise:  [x]  See flow sheet Rationale:      increase ROM and increase strength to improve the patients ability to return to sport 12 min Manual Therapy: GHJ prom all directions, ir/er stretch in supine and prone, post cuff release, dtm anterior shoulder mid deltoid, grade iii post mob, grade iii pa mobs t3-7 Rationale:      decrease pain, increase ROM, increase tissue extensibility and decrease trigger points to improve patient's ability to return to sport Billed With/As: 
 [x] TE 
 [] TA 
 [] Neuro 
 [] Self Care Patient Education: [x] Review HEP [] Progressed/Changed HEP based on:  
[x] positioning   [x] body mechanics   [] transfers   [x] heat/ice application   
[x] other: avoid throwing Other Objective/Functional Measures: 
Unable to achieve 90/90 after 8 reps with ball on wall or 90/90 for any reps without t/s rotation compensation Denied any pain with MT Post Treatment Pain Level (on 0 to 10) scale:   0  / 10 ASSESSMENT Assessment/Changes in Function:  
Still lacking motion to achieve overhead throwing position  
  
[]  See Progress Note/Recertification Patient will continue to benefit from skilled PT services to modify and progress therapeutic interventions, address functional mobility deficits, address ROM deficits, address strength deficits, analyze and address soft tissue restrictions, analyze and cue movement patterns, analyze and modify body mechanics/ergonomics, assess and modify postural abnormalities and instruct in home and community integration to attain remaining goals. Progress toward goals / Updated goals: 
Slow progression with return to sport PLAN 
[]  Upgrade activities as tolerated yes Continue plan of care  
[]  Discharge due to :   
[]  Other:   
 
Therapist: Kellie Gaston, PT Date: 2/28/2019 Time: 3:30 PM  
 
Future Appointments Date Time Provider Carla Catalan 2/28/2019 11:00 AM Gaye Joseph, PT [facility]  
3/5/2019 10:30 AM Gaye Joseph, PT [address] [place] [address]  
3/7/2019 10:00 AM Gaye Joseph, PT [address] [place] [address]  
3/12/2019 10:00 AM Gaye Joseph, PT [facility]  
3/14/2019 10:30 AM Gaye Joseph, PT [address] [place] [address]  
3/19/2019  3:00 PM Moon Coley [facility]  
3/21/2019  9:00 AM Gaye Joseph, PT [facility]  
3/26/2019 10:00 AM Gaye Joseph, PT [facility]  
3/28/2019  9:00 AM Hartzog, Claudell Critchley, PT [address] [place] [address]

## 2019-03-05 ENCOUNTER — HOSPITAL ENCOUNTER (OUTPATIENT)
Dept: PHYSICAL THERAPY | Age: 19
Discharge: HOME | End: 2019-03-05
Payer: COMMERCIAL

## 2019-03-05 PROCEDURE — 97140 MANUAL THERAPY 1/> REGIONS: CPT | Performed by: PHYSICAL THERAPIST

## 2019-03-05 PROCEDURE — 97110 THERAPEUTIC EXERCISES: CPT | Performed by: PHYSICAL THERAPIST

## 2019-03-05 NOTE — PROGRESS NOTES
[facility] PHYSICAL THERAPY - DAILY TREATMENT NOTE    Patient Name: Danita Zabala        Date: 3/5/2019  : 2000   yes Patient  Verified  Visit #:   9     Insurance: Payor: Luis Clark / Plan: [address] [place] [address] / Product Type: PPO /      In time: 1031 Out time: 1130   Total Treatment Time: 55     Medicare/BCBS Time Tracking (below)   Total Timed Codes (min):  55 1:1 Treatment Time:  55     TREATMENT AREA =  Right shoulder pain [M25.511]    SUBJECTIVE  Pain Level (on 0 to 10 scale):  0  / 10   Medication Changes/New allergies or changes in medical history, any new surgeries or procedures?     yes  If yes, update Summary List   Subjective Functional Status/Changes:  []  No changes reported     I have really been working hard to get my arm back as far as I can      OBJECTIVE        40 min Therapeutic Exercise:  [x]  See flow sheet   Rationale:      increase ROM and increase strength to improve the patients ability to return to sport     12 min Manual Therapy: GHJ prom all directions, ir/er stretch in supine and prone, post cuff release, lat release, grade iii post mob, grade iii pa mobs t3-7   Rationale:      decrease pain, increase ROM, increase tissue extensibility and decrease trigger points to improve patient's ability to return to sport       Billed With/As:   [x] TE   [] TA   [] Neuro   [] Self Care Patient Education: [x] Review HEP    [] Progressed/Changed HEP based on:   [x] positioning   [x] body mechanics   [] transfers   [x] heat/ice application    [x] other: avoid throwing     Other Objective/Functional Measures:  Able to achieve 90 abd with 66 er without pain and increased ease to achieve rom without thoracic compensation  Fatigued quickly with 90/90 and cued to avoid UT hike   Post Treatment Pain Level (on 0 to 10) scale:   0  / 10     ASSESSMENT  Assessment/Changes in Function:   Still lacking motion to achieve overhead throwing position but made significant gains with tissue extensibility since last visit      []  See Progress Note/Recertification   Patient will continue to benefit from skilled PT services to modify and progress therapeutic interventions, address functional mobility deficits, address ROM deficits, address strength deficits, analyze and address soft tissue restrictions, analyze and cue movement patterns, analyze and modify body mechanics/ergonomics, assess and modify postural abnormalities and instruct in home and community integration to attain remaining goals.    Progress toward goals / Updated goals:    Slow progression with return to pitching but making gains with overhead in the last week      PLAN  []  Upgrade activities as tolerated yes Continue plan of care   []  Discharge due to :    []  Other:      Therapist: Luis A Ceja PT    Date: 3/5/2019 Time: 3:30 PM     Future Appointments   Date Time Provider Carla Catalan   3/7/2019 10:00 AM Bisi Lerma, PT [facility]   3/12/2019 10:00 AM Bisi Lerma, PT [facility]   3/14/2019 10:30 AM Bisi Lerma, PT [facility]   3/19/2019  3:00 PM Ponce Olivier [facility]   3/21/2019  9:00 AM Bisi Lerma, PT [facility]   3/26/2019 10:00 AM Bisi Lerma, PT [facility]   3/28/2019  9:00 AM Sebastian Chávez, PT [facility]

## 2019-03-07 ENCOUNTER — HOSPITAL ENCOUNTER (OUTPATIENT)
Dept: PHYSICAL THERAPY | Age: 19
Discharge: HOME | End: 2019-03-07
Payer: COMMERCIAL

## 2019-03-07 PROCEDURE — 97110 THERAPEUTIC EXERCISES: CPT | Performed by: PHYSICAL THERAPIST

## 2019-03-07 PROCEDURE — 97140 MANUAL THERAPY 1/> REGIONS: CPT | Performed by: PHYSICAL THERAPIST

## 2019-03-07 NOTE — PROGRESS NOTES
[facility] PHYSICAL THERAPY - DAILY TREATMENT NOTE    Patient Name: Maira Contreras        Date: 3/7/2019  : 2000   yes Patient  Verified  Visit #:   10     Insurance: Payor: Parminder La / Plan: [address] [place] [address] / Product Type: PPO /      In time: 1005 Out time: 1055   Total Treatment Time: 50     Medicare/BCBS Time Tracking (below)   Total Timed Codes (min):  50 1:1 Treatment Time:  35     TREATMENT AREA =  Right shoulder pain [M25.511]    SUBJECTIVE  Pain Level (on 0 to 10 scale):  0  / 10   Medication Changes/New allergies or changes in medical history, any new surgeries or procedures?     yes  If yes, update Summary List   Subjective Functional Status/Changes:  []  No changes reported     I have had little if any pain at all after last session and went to batting practice without any pain at all      OBJECTIVE        35 min Therapeutic Exercise:  [x]  See flow sheet   Rationale:      increase ROM and increase strength to improve the patients ability to return to sport     15 min Manual Therapy: GHJ prom all directions, ir/er stretch in supine and prone, post cuff release, lat release, grade iii post mob, grade iii pa mobs t3-7   Rationale:      decrease pain, increase ROM, increase tissue extensibility and decrease trigger points to improve patient's ability to return to sport       Billed With/As:   [x] TE   [] TA   [] Neuro   [] Self Care Patient Education: [x] Review HEP    [] Progressed/Changed HEP based on:   [x] positioning   [x] body mechanics   [] transfers   [x] heat/ice application    [x] other: avoid throwing     Other Objective/Functional Measures:  1:1 [id number]  Able to perform 10 mock throws with long throw and 10 short throw without c/o pain   Denied any tenderness with palpation to posterior cuff or 80 er rom    Post Treatment Pain Level (on 0 to 10) scale:   0  / 10     ASSESSMENT  Assessment/Changes in Function:   Pt advised to not throw outside of clinic and will test next session      []  See Progress Note/Recertification   Patient will continue to benefit from skilled PT services to modify and progress therapeutic interventions, address functional mobility deficits, address ROM deficits, address strength deficits, analyze and address soft tissue restrictions, analyze and cue movement patterns, analyze and modify body mechanics/ergonomics, assess and modify postural abnormalities and instruct in home and community integration to attain remaining goals.    Progress toward goals / Updated goals:    Slow progression with return to pitching but making gains with overhead in the last week      PLAN  []  Upgrade activities as tolerated yes Continue plan of care   []  Discharge due to :    []  Other:      Therapist: To Caraballo PT    Date: 3/7/2019 Time: 3:30 PM     Future Appointments   Date Time Provider Carla Catalan   3/12/2019 10:00 AM Luis Diaz PT [facility]   3/14/2019 10:30 AM Luis Diaz, PT [facility]   3/19/2019  3:00 PM Saroj Rhoades [facility]   3/21/2019  9:00 AM Luis Diaz PT [facility]   3/26/2019 10:00 AM Luis Diaz, PT [facility]   3/28/2019  9:00 AM Genevieve Chávez, PT [facility]

## 2019-03-12 ENCOUNTER — HOSPITAL ENCOUNTER (OUTPATIENT)
Dept: PHYSICAL THERAPY | Age: 19
Discharge: HOME | End: 2019-03-12
Payer: COMMERCIAL

## 2019-03-12 PROCEDURE — 97110 THERAPEUTIC EXERCISES: CPT | Performed by: PHYSICAL THERAPIST

## 2019-03-12 NOTE — PROGRESS NOTES
[facility] PHYSICAL THERAPY - DAILY TREATMENT NOTE    Patient Name: Jesica Schreiber        Date: 3/12/2019  : 2000   yes Patient  Verified  Visit #:    Insurance: Payor: Elissa Hernandez / Plan: [address] [place] [address] / Product Type: PPO /      In time: 300 Out time: 353   Total Treatment Time: 53     Medicare/BCBS Time Tracking (below)   Total Timed Codes (min):  53 1:1 Treatment Time:  24     TREATMENT AREA =  Right shoulder pain [M25.511]    SUBJECTIVE  Pain Level (on 0 to 10 scale):  0  / 10   Medication Changes/New allergies or changes in medical history, any new surgeries or procedures?     yes  If yes, update Summary List   Subjective Functional Status/Changes:  []  No changes reported     I am feeling really good      OBJECTIVE        38 min Therapeutic Exercise:  [x]  See flow sheet   Rationale:      increase ROM and increase strength to improve the patients ability to return to sport     15 min Manual Therapy: GHJ prom all directions, ir/er stretch in supine and prone, post cuff release, lat release, grade iii post mob, grade iii pa mobs t3-7   Rationale:      decrease pain, increase ROM, increase tissue extensibility and decrease trigger points to improve patient's ability to return to sport       Billed With/As:   [x] TE   [] TA   [] Neuro   [] Self Care Patient Education: [x] Review HEP    [] Progressed/Changed HEP based on:   [x] positioning   [x] body mechanics   [] transfers   [x] heat/ice application    [x] other: avoid throwing     Other Objective/Functional Measures:  1:1 300-324  arom flex 168 without hike, scaption 165, IR t12 spinal level, ER 59   Able to throw 10 toss 10'50%, 15' 50% without pain  Pt denied any tenderness along posterior cuff    Post Treatment Pain Level (on 0 to 10) scale:   0  / 10     ASSESSMENT  Assessment/Changes in Function:   Denied any increase in pain with throwing - advised to hold on participation in practice until increase distance in clinic - izabel verbal understanding      []  See Progress Note/Recertification   Patient will continue to benefit from skilled PT services to modify and progress therapeutic interventions, address functional mobility deficits, address ROM deficits, address strength deficits, analyze and address soft tissue restrictions, analyze and cue movement patterns, analyze and modify body mechanics/ergonomics, assess and modify postural abnormalities and instruct in home and community integration to attain remaining goals.    Progress toward goals / Updated goals:  Progress with return to sport goal        PLAN  []  Upgrade activities as tolerated yes Continue plan of care   []  Discharge due to :    []  Other:      Therapist: Cole Rowe PT    Date: 3/12/2019 Time: 3:30 PM     Future Appointments   Date Time Provider Carla Catalan   3/12/2019 10:00 AM Rose Melgar PT [facility]   3/14/2019 10:30 AM Rose Melgar, PT [address] [place] [address]   [facility]   3/21/2019  9:00 AM Rose Melgar PT [facility]   3/26/2019 10:00 AM Rose Melgar PT [facility]   3/28/2019  9:00 AM Xi Chávez, PT [facility]

## 2019-03-14 ENCOUNTER — HOSPITAL ENCOUNTER (OUTPATIENT)
Dept: PHYSICAL THERAPY | Age: 19
Discharge: HOME | End: 2019-03-14
Payer: COMMERCIAL

## 2019-03-14 PROCEDURE — 97140 MANUAL THERAPY 1/> REGIONS: CPT | Performed by: PHYSICAL THERAPIST

## 2019-03-14 PROCEDURE — 97110 THERAPEUTIC EXERCISES: CPT | Performed by: PHYSICAL THERAPIST

## 2019-03-14 NOTE — PROGRESS NOTES
[facility] PHYSICAL THERAP[facility] - DAILY TREATMENT NOTE    Patient Name: Lloyd Abbott        Date: 3/14/2019  : 2000   yes Patient  Verified  Visit #:    Insurance: Payor: Nnamdi Daniel / Plan: [address] [place] [address] / Product Type: PPO /      In time: 1033 Out time: 1128   Total Treatment Time: 50     Medicare/BCBS Time Tracking (below)   Total Timed Codes (min):  50 1:1 Treatment Time:  30     TREATMENT AREA =  Right shoulder pain [M25.511]    SUBJECTIVE  Pain Level (on 0 to 10 scale):  0  / 10   Medication Changes/New allergies or changes in medical history, any new surgeries or procedures?     yes  If yes, update Summary List   Subjective Functional Status/Changes:  []  No changes reported     I threw the same distance we did at practice and it did not bother me      OBJECTIVE        40 min Therapeutic Exercise:  [x]  See flow sheet   Rationale:      increase ROM and increase strength to improve the patients ability to return to sport     10 min Manual Therapy: GHJ prom all directions, ir/er stretch in supine and prone, post cuff release, lat release, grade iii post mob, grade iii pa mobs t3-7   Rationale:      decrease pain, increase ROM, increase tissue extensibility and decrease trigger points to improve patient's ability to return to sport       Billed With/As:   [x] TE   [] TA   [] Neuro   [] Self Care Patient Education: [x] Review HEP    [] Progressed/Changed HEP based on:   [x] positioning   [x] body mechanics   [] transfers   [x] heat/ice application    [x] other: avoid throwing     Other Objective/Functional Measures:  1:1 [id number]  Able to throw 10 toss 10'50%, 15' 50% 20' 25% without pain but reduced strength and accuracy with increasing distance, noted short arm as well   Pt denied any tenderness along posterior cuff    Post Treatment Pain Level (on 0 to 10) scale:   0  / 10     ASSESSMENT  Assessment/Changes in Function:   Pt advised able to throw 35 balls max on Saturday and immediately stop if having any progressive pain or discomfort. Demo verbal understanding. []  See Progress Note/Recertification   Patient will continue to benefit from skilled PT services to modify and progress therapeutic interventions, address functional mobility deficits, address ROM deficits, address strength deficits, analyze and address soft tissue restrictions, analyze and cue movement patterns, analyze and modify body mechanics/ergonomics, assess and modify postural abnormalities and instruct in home and community integration to attain remaining goals.    Progress toward goals / Updated goals:  Progress with return to sport goal        PLAN  []  Upgrade activities as tolerated yes Continue plan of care   []  Discharge due to :    []  Other:      Therapist: Mireya Chio PT    Date: 3/14/2019 Time: 3:30 PM     Future Appointments   Date Time Provider Carla Catalan   3/19/2019  3:00 PM Eric Gerber PT [facility]   3/21/2019  9:00 AM Eric Gerber, PT [facility]   3/26/2019 10:00 AM Eric Gerber PT [facility]   3/28/2019  9:00 AM Pietro Chávez, PT [facility]

## 2019-03-22 ENCOUNTER — HOSPITAL ENCOUNTER (OUTPATIENT)
Dept: PHYSICAL THERAPY | Age: 19
Discharge: HOME | End: 2019-03-22
Payer: COMMERCIAL

## 2019-03-22 PROCEDURE — 97110 THERAPEUTIC EXERCISES: CPT | Performed by: PHYSICAL THERAPIST

## 2019-03-22 PROCEDURE — 97140 MANUAL THERAPY 1/> REGIONS: CPT | Performed by: PHYSICAL THERAPIST

## 2019-03-22 NOTE — PROGRESS NOTES
.[facility] [address] [facility] THERAPY 
[address],[facility], [address] [facility] - Phone: [phone]  Fax: [phone] PROGRESS NOTE Patient Name: Lloyd Abbott : 2000 Treatment/Medical Diagnosis: Right shoulder pain [M25.511] Referral Source: Karl Mccloud MD    
Date of Initial Visit: 19 Attended Visits: 13 Missed Visits: 2 SUMMARY OF TREATMENT Pt was seen for IE and 12 f/u visits since over last 9  weeks with treatment consisting of therapeutic exercises for shoulder rom/leland-scapular stability, manual therapy (prom/mobs/stm) and modalities prn. In addition pt was instructed on hep CURRENT STATUS Pt has made good  progress with PT in the past month of PT. Our emphasis was restoring rom and strength prior to return to throwing. Shoulder arom flex 168, abduction 160, ER at 90 86, IR 67. Continues with posterior capsule and subscapularis tightness but these are not limiting function. We have begun a return to throwing program and she is currently at 36' without any reports of pain. Currently progressing with distance throwing. Would like to continue an additional 2-3 weeks to progress to full distance throwing Goal/Measure of Progress Goal Met? 1. Pt will initiate return to overhead throwing program   
Status at last Eval: Pain  Current Status:  yes 2. Pt will achieve >/=+5 on GROC in order to promote increased activity tolerance Status at last Eval: +3 Current Status: +5 yes 3. Pt will increase R shoulder IR/ER strength by 1/3 MMT to promote return to throwing program  
Status at last Eval: IR 4/5, ER 4-/5 Current Status: IR/ER 4+/5 yes New Goals to be achieved in __2-3__  weeks: 1. Pt will throw >/=60' without progressive pain 2. Pt will be I with advance throwing program to progress towards d/c 3. Pt will increase FOTO an additional 3 points in order to show functional improvement RECOMMENDATIONS Continue with therapy an additional 2x/2-3 weeks If you have any questions/comments please contact us directly at [phone]. Thank you for allowing us to assist in the care of your patient. Therapist Signature: Sharon Ovalle DPT, [facility]  Date: 3/22/2019 Time: 4:17 PM  
NOTE TO PHYSICIAN:  PLEASE COMPLETE THE ORDERS BELOW AND FAX TO [facility] Physical Therapy: [phone] If you are unable to process this request in 24 hours please contact our office: [phone] 
 
___ I have read the above report and request that my patient continue as recommended.  
___ I have read the above report and request that my patient continue therapy with the following changes/special instructions:_________________________________________________________  
___ I have read the above report and request that my patient be discharged from therapy.   
 
Physician Signature:       Date:      Time:

## 2019-03-22 NOTE — PROGRESS NOTES
[facility]il[facility] PHYSICAL THERAPY - DAILY TREATMENT NOTE Patient Name: Jesica Schreiber        Date: 3/22/2019 : 2000   yes Patient  Verified Visit #:   15   of   18 Insurance: Payor: Elissa Hernandez / Plan: [address] [place] [address] / Product Type: PPO / In time: 830 Out time: 785 Total Treatment Time: 50 Medicare/BCBS Time Tracking (below) Total Timed Codes (min):  50 1:1 Treatment Time:  50 TREATMENT AREA =  Right shoulder pain [M25.511] SUBJECTIVE Pain Level (on 0 to 10 scale):  0  / 10 Medication Changes/New allergies or changes in medical history, any new surgeries or procedures? yes  If yes, update Summary List  
Subjective Functional Status/Changes:  []  No changes reported See pn OBJECTIVE 40 min Therapeutic Exercise:  [x]  See flow sheet Rationale:      increase ROM and increase strength to improve the patients ability to return to sport 10 min Manual Therapy: GHJ prom all directions, ir/er stretch in supine and prone, post cuff release, lat release, grade iii post mob, grade iii pa mobs t3-7 Rationale:      decrease pain, increase ROM, increase tissue extensibility and decrease trigger points to improve patient's ability to return to sport Billed With/As: 
 [x] TE 
 [] TA 
 [] Neuro 
 [] Self Care Patient Education: [x] Review HEP [] Progressed/Changed HEP based on:  
[x] positioning   [x] body mechanics   [] transfers   [x] heat/ice application   
[x] other: avoid throwing Other Objective/Functional Measures: 
See pn   
Post Treatment Pain Level (on 0 to 10) scale:   0  / 10 ASSESSMENT Assessment/Changes in Function:  
See pn  
  
[]  See Progress Note/Recertification Patient will continue to benefit from skilled PT services to modify and progress therapeutic interventions, address functional mobility deficits, address ROM deficits, address strength deficits, analyze and address soft tissue restrictions, analyze and cue movement patterns, analyze and modify body mechanics/ergonomics, assess and modify postural abnormalities and instruct in home and community integration to attain remaining goals. Progress toward goals / Updated goals: 
see PLAN 
[]  Upgrade activities as tolerated yes Continue plan of care  
[]  Discharge due to :   
[]  Other:   
 
Therapist: Cole Rowe PT Date: 3/22/2019 Time: 3:30 PM  
 
Future Appointments Date Time Provider Carla Catalan 3/26/2019 10:00 AM Rose Melgar, PT [facility]  
3/28/2019  9:00 AM Xi Chávez, PT [facility]

## 2019-03-26 ENCOUNTER — HOSPITAL ENCOUNTER (OUTPATIENT)
Dept: PHYSICAL THERAPY | Age: 19
Discharge: HOME | End: 2019-03-26
Payer: COMMERCIAL

## 2019-03-26 PROCEDURE — 97140 MANUAL THERAPY 1/> REGIONS: CPT | Performed by: PHYSICAL THERAPIST

## 2019-03-26 PROCEDURE — 97110 THERAPEUTIC EXERCISES: CPT | Performed by: PHYSICAL THERAPIST

## 2019-03-26 NOTE — PROGRESS NOTES
Ronn Madsen PHYSICAL THERAPY - DAILY TREATMENT NOTE Patient Name: Yossi Rivera        Date: 3/26/2019 : 2000   yes Patient  Verified Visit #:    Insurance: Payor: Shaye Lozoya / Plan: Micah Pi / Product Type: PPO / In time: 1000 Out time: 1100 Total Treatment Time: 62 Medicare/[facility] Time Tracking (below) Total Timed Codes (min):  57 1:1 Treatment Time:  62 TREATMENT AREA =  Right shoulder pain [M25.511] SUBJECTIVE Pain Level (on 0 to 10 scale):  0  / 10 Medication Changes/New allergies or changes in medical history, any new surgeries or procedures? yes  If yes, update Summary List  
Subjective Functional Status/Changes:  []  No changes reported I played the first game and use my cut for 4 throws, spoke with my  and if I felt good said I could play the next game os I ended up playing 3 total. I threw maybe 20 balls and I had no pain. I did feel sore on  but more muscular than anything else OBJECTIVE 45 min Therapeutic Exercise:  [x]  See flow sheet Rationale:      increase ROM and increase strength to improve the patients ability to return to sport 12 min Manual Therapy: GHJ prom all directions, ir/er stretch in supine and prone, post cuff release, lat release, grade iii post mob Rationale:      decrease pain, increase ROM, increase tissue extensibility and decrease trigger points to improve patient's ability to return to sport Billed With/As: 
 [x] TE 
 [] TA 
 [] Neuro 
 [] Self Care Patient Education: [x] Review HEP [] Progressed/Changed HEP based on:  
[x] positioning   [x] body mechanics   [] transfers   [x] heat/ice application   
[x] other: avoid throwing Other Objective/Functional Measures: 
Required increased mt to achieve 90/90 but able to achieve 10 throws at 10, 15 20 ft without pain and good accuracy (no side arm noted) Post Treatment Pain Level (on 0 to 10) scale:   0  / 10 ASSESSMENT Assessment/Changes in Function:  
Continues to demonstrate reduced lower trap activation during overhead motion 
  
[]  See Progress Note/Recertification Patient will continue to benefit from skilled PT services to modify and progress therapeutic interventions, address functional mobility deficits, address ROM deficits, address strength deficits, analyze and address soft tissue restrictions, analyze and cue movement patterns, analyze and modify body mechanics/ergonomics, assess and modify postural abnormalities and instruct in home and community integration to attain remaining goals. Progress toward goals / Updated goals: 
Pt agreed to continue with therapy an additional 2-3 weeks to address retuning to full participation in throwing PLAN 
[]  Upgrade activities as tolerated yes Continue plan of care  
[]  Discharge due to :   
[]  Other:   
 
Therapist: Pérez Landaverde PT Date: 3/26/2019 Time: 3:30 PM  
 
Future Appointments Date Time Provider aCrla Catalan 3/26/2019 10:00 AM Bonilla Soto, WINDY BRYANT [facility]  
3/28/2019  9:00 AM Sixto Chávez, PT [facility]

## 2019-03-28 ENCOUNTER — HOSPITAL ENCOUNTER (OUTPATIENT)
Dept: PHYSICAL THERAPY | Age: 19
Discharge: HOME | End: 2019-03-28
Payer: COMMERCIAL

## 2019-03-28 PROCEDURE — 97140 MANUAL THERAPY 1/> REGIONS: CPT | Performed by: PHYSICAL THERAPIST

## 2019-03-28 PROCEDURE — 97110 THERAPEUTIC EXERCISES: CPT | Performed by: PHYSICAL THERAPIST

## 2019-03-28 NOTE — PROGRESS NOTES
Lenny Granger PHYSICAL THERAPY - DAILY TREATMENT NOTE Patient Name: Torres Quach        Date: 3/28/2019 : 2000   yes Patient  Verified Visit #:   15   of   18 Insurance: Payor: BLUE CROSS / Plan: [address] [place] [address] / Product Type: PPO / In time: 858 Out time: 950 Total Treatment Time: 50 Medicare/[employer] Time Tracking (below) Total Timed Codes (min):  50 1:1 Treatment Time:  50 TREATMENT AREA =  Right shoulder pain [M25.511] SUBJECTIVE Pain Level (on 0 to 10 scale):  0  / 10 Medication Changes/New allergies or changes in medical history, any new surgeries or procedures? yes  If yes, update Summary List  
Subjective Functional Status/Changes:  []  No changes reported No pain at all after the last session and has been feeling really good. OBJECTIVE 25 min Therapeutic Exercise:  [x]  See flow sheet Rationale:      increase ROM and increase strength to improve the patients ability to return to sport 25 min Manual Therapy: GHJ prom all directions, ir/er stretch in supine and prone, post cuff release, lat release, grade iii post mob, t3-6 pa mob grade iii, kt tape for anterior translation inhibition Rationale:      decrease pain, increase ROM, increase tissue extensibility and decrease trigger points to improve patient's ability to return to sport Billed With/As: 
 [x] TE 
 [] TA 
 [] Neuro 
 [] Self Care Patient Education: [x] Review HEP [] Progressed/Changed HEP based on:  
[x] positioning   [x] body mechanics   [] transfers   [x] heat/ice application   
[x] other: avoid throwing Other Objective/Functional Measures: Modified te including holding on all throwing as pt has a game today. Performed te followed by mt including kt to tape to address any anterior instability during throwing Reduced upper t/s mobility t3-6 Post Treatment Pain Level (on 0 to 10) scale:   0  / 10 ASSESSMENT Assessment/Changes in Function: Pt advised able to participate in game today with use of cut off to home prn  
  
[]  See Progress Note/Recertification Patient will continue to benefit from skilled PT services to modify and progress therapeutic interventions, address functional mobility deficits, address ROM deficits, address strength deficits, analyze and address soft tissue restrictions, analyze and cue movement patterns, analyze and modify body mechanics/ergonomics, assess and modify postural abnormalities and instruct in home and community integration to attain remaining goals. Progress toward goals / Updated goals: 
Progressing towards all established goals PLAN 
[]  Upgrade activities as tolerated yes Continue plan of care  
[]  Discharge due to :   
[]  Other:   
 
Therapist: Kt Shaver PT Date: 3/28/2019 Time: 3:30 PM  
 
Future Appointments Date Time Provider Carla Catalan 4/2/2019  4:30 PM Dixon Mcdonald, PT [facility]  
4/9/2019  3:30 PM Dixon Mcdonald, PT [facility]  
4/11/2019  9:00 AM Dixon Mcdonald, PT [facility]  
4/16/2019  3:30 PM Dixon Mcdonald, PT [facility]

## 2019-04-02 ENCOUNTER — HOSPITAL ENCOUNTER (OUTPATIENT)
Dept: PHYSICAL THERAPY | Age: 19
Discharge: HOME | End: 2019-04-02
Payer: COMMERCIAL

## 2019-04-02 PROCEDURE — 97140 MANUAL THERAPY 1/> REGIONS: CPT | Performed by: PHYSICAL THERAPIST

## 2019-04-02 NOTE — PROGRESS NOTES
Shalini Quintanilla PHYSICAL THERAPY - DAILY TREATMENT NOTE Patient Name: Sterling Razo        Date: 2019 : 2000   yes Patient  Verified Visit #:    Insurance: Payor: Franky Crow / Plan: [address] [place] [address] / Product Type: PPO / In time: 432 Out time: 1 Total Treatment Time: 60 Medicare/[facility] Time Tracking (below) Total Timed Codes (min):  60 1:1 Treatment Time:  22 TREATMENT AREA =  Right shoulder pain [M25.511] SUBJECTIVE Pain Level (on 0 to 10 scale):  0  / 10 Medication Changes/New allergies or changes in medical history, any new surgeries or procedures? yes  If yes, update Summary List  
Subjective Functional Status/Changes:  []  No changes reported I played in the whole game on Saturday and did not have any increase in pain . between my shoulder blades is really bothering me for some reason OBJECTIVE 38 min Therapeutic Exercise:  [x]  See flow sheet Rationale:      increase ROM and increase strength to improve the patients ability to return to sport 22 min Manual Therapy: GHJ prom all directions, ir/er stretch in supine and prone, post cuff release, lat release, grade iii post mob, t3-6 pa mob grade iii, kt tape for anterior translation inhibition Rationale:      decrease pain, increase ROM, increase tissue extensibility and decrease trigger points to improve patient's ability to return to sport Billed With/As: 
 [x] TE 
 [] TA 
 [] Neuro 
 [] Self Care Patient Education: [x] Review HEP [] Progressed/Changed HEP based on:  
[x] positioning   [x] body mechanics   [] transfers   [x] heat/ice application   
[x] other: avoid throwing Other Objective/Functional Measures: 
1:1 mt only Increased mm restrictions along lats and mid t/s paraspinals, able to throw 10 at 100% with full range Post Treatment Pain Level (on 0 to 10) scale:   0  / 10 ASSESSMENT Assessment/Changes in Function:  
Continues to reutrn to play without increasing pain []  See Progress Note/Recertification Patient will continue to benefit from skilled PT services to modify and progress therapeutic interventions, address functional mobility deficits, address ROM deficits, address strength deficits, analyze and address soft tissue restrictions, analyze and cue movement patterns, analyze and modify body mechanics/ergonomics, assess and modify postural abnormalities and instruct in home and community integration to attain remaining goals. Progress toward goals / Updated goals: 
Pt foto score went down despite +7 on [facility] PLAN 
[]  Upgrade activities as tolerated yes Continue plan of care  
[]  Discharge due to :   
[]  Other:   
 
Therapist: Jillian Jones PT Date: 4/2/2019 Time: 3:30 PM  
 
Future Appointments Date Time Provider Carla Catalan 4/2/2019  4:30 PM Opal House PT [facility]  
4/9/2019  3:30 PM Opal House, PT [facility]  
4/11/2019  9:00 AM Opal House PT [facility]  
4/16/2019  3:30 PM Oapl House, PT [facility]

## 2019-04-09 ENCOUNTER — HOSPITAL ENCOUNTER (OUTPATIENT)
Dept: PHYSICAL THERAPY | Age: 19
Discharge: HOME | End: 2019-04-09
Payer: COMMERCIAL

## 2019-04-09 PROCEDURE — 97110 THERAPEUTIC EXERCISES: CPT | Performed by: PHYSICAL THERAPIST

## 2019-04-09 PROCEDURE — 97140 MANUAL THERAPY 1/> REGIONS: CPT | Performed by: PHYSICAL THERAPIST

## 2019-04-09 NOTE — PROGRESS NOTES
Keron Henderson PHYSICAL THERAPY - DAILY TREATMENT NOTE Patient Name: Jia Fang        Date: 2019 : 2000   yes Patient  Verified Visit #:    Insurance: Payor: BLUE CROSS / Plan: [address] [place] [address] / Product Type: PPO / In time: 332 Out time: 452 Total Treatment Time: 70 Medicare/BCBS Time Tracking (below) Total Timed Codes (min):  60 1:1 Treatment Time:  60 TREATMENT AREA =  Right shoulder pain [M25.511] SUBJECTIVE Pain Level (on 0 to 10 scale):  0  / 10 Medication Changes/New allergies or changes in medical history, any new surgeries or procedures? yes  If yes, update Summary List  
Subjective Functional Status/Changes:  []  No changes reported I played 3 games over the weekend and I threw a lot more than I thought I was going to - they were all to the cut and I had no pain just a lot of soreness on  OBJECTIVE Modalities Rationale:     decrease pain to improve patient's ability to perform ADLs.   min [] Estim, type/location:                          
                                 []  att     []  unatt     []  w/US     []  w/ice    []  w/heat  
  min []  Mechanical Traction: type/lbs    
               []  pro   []  sup   []  int   []  cont    []  before manual    []  after manual  
  min []  Ultrasound, settings/location:     
  min []  Iontophoresis w/ dexamethasone, location:    
                                           []  take home patch       []  in clinic  
10 min [x]  Ice     []  Heat    location/position: Long sit  
  min []  Vasopneumatic Device, press/temp:    
  min []  Other:    
  
 
 
 
45 min Therapeutic Exercise:  [x]  See flow sheet Rationale:      increase ROM and increase strength to improve the patients ability to return to sport 15 min Manual Therapy: GHJ prom all directions, ir/er stretch in supine and prone, post cuff release, lat release, grade iii post mob, t3-6 pa mob grade iii Rationale:      decrease pain, increase ROM, increase tissue extensibility and decrease trigger points to improve patient's ability to return to sport Billed With/As: 
 [x] TE 
 [] TA 
 [] Neuro 
 [] Self Care Patient Education: [x] Review HEP [] Progressed/Changed HEP based on:  
[x] positioning   [x] body mechanics   [] transfers   [x] heat/ice application   
[x] other: avoid throwing Other Objective/Functional Measures: 
Continues to report ulnar nerve distribution numbness with 90/90 positioned - emphasized this during throwing as pt will adopt trunk side bend and medial elbow drop when fatigued Post Treatment Pain Level (on 0 to 10) scale:   0  / 10 ASSESSMENT Assessment/Changes in Function:  
Denied any pain  
  
[]  See Progress Note/Recertification Patient will continue to benefit from skilled PT services to modify and progress therapeutic interventions, address functional mobility deficits, address ROM deficits, address strength deficits, analyze and address soft tissue restrictions, analyze and cue movement patterns, analyze and modify body mechanics/ergonomics, assess and modify postural abnormalities and instruct in home and community integration to attain remaining goals. Progress toward goals / Updated goals: Pt progressing towards d/c form therapy - advised to discuss with  appropriate outfield throwing mechanics PLAN 
[]  Upgrade activities as tolerated yes Continue plan of care  
[]  Discharge due to :   
[]  Other:   
 
Therapist: Garrison Hunter PT Date: 4/9/2019 Time: 3:30 PM  
 
Future Appointments Date Time Provider Carla Catalan 4/9/2019  3:30 PM Syed Watts, PT [facility]  
4/11/2019  9:00 AM Syed Watts, PT [facility]  
4/16/2019  3:30 PM Yaz Chávez, PT [facility]

## 2019-04-11 ENCOUNTER — HOSPITAL ENCOUNTER (OUTPATIENT)
Dept: PHYSICAL THERAPY | Age: 19
Discharge: HOME | End: 2019-04-11
Payer: COMMERCIAL

## 2019-04-11 PROCEDURE — 97110 THERAPEUTIC EXERCISES: CPT | Performed by: PHYSICAL THERAPIST

## 2019-04-11 PROCEDURE — 97140 MANUAL THERAPY 1/> REGIONS: CPT | Performed by: PHYSICAL THERAPIST

## 2019-04-11 NOTE — PROGRESS NOTES
Luna Dawkins PHYSICAL THERAPY - DAILY TREATMENT NOTE Patient Name: Ki Chairez        Date: 2019 : 2000   yes Patient  Verified Visit #:    Insurance: Payor: BLUE CROSS / Plan: [address] [place] [address] / Product Type: PPO / In time: 900 Out time: 56 Total Treatment Time: 48 Medicare/[facility] Time Tracking (below) Total Timed Codes (min):  48 1:1 Treatment Time:  48 TREATMENT AREA =  Right shoulder pain [M25.511] SUBJECTIVE Pain Level (on 0 to 10 scale):  0  / 10 Medication Changes/New allergies or changes in medical history, any new surgeries or procedures? yes  If yes, update Summary List  
Subjective Functional Status/Changes:  []  No changes reported I threw about 20-30 more balls at 100% and I did not have any pain I am just muscular sore. OBJECTIVE 25 min Therapeutic Exercise:  [x]  See flow sheet Rationale:      increase ROM and increase strength to improve the patients ability to return to sport 23 min Manual Therapy: GHJ prom all directions, ir/er stretch in supine and prone, post cuff release, lat release, grade iii post mob, t3-6 pa mob grade iii, kinesio tape for anterior shoulder stability Rationale:      decrease pain, increase ROM, increase tissue extensibility and decrease trigger points to improve patient's ability to return to sport Billed With/As: 
 [x] TE 
 [] TA 
 [] Neuro 
 [] Self Care Patient Education: [x] Review HEP [] Progressed/Changed HEP based on:  
[x] positioning   [x] body mechanics   [] transfers   [x] heat/ice application   
[x] other: avoid throwing Other Objective/Functional Measures: Pt with increased agility exercises and throwing at practice yesterday and 2 games tomorrow so modified te as per flow sheet 
ttp along posterior cuff and anterior aspect of medial deltoid Post Treatment Pain Level (on 0 to 10) scale:   0  / 10 ASSESSMENT Assessment/Changes in Function:  
Denied any pain []  See Progress Note/Recertification Patient will continue to benefit from skilled PT services to modify and progress therapeutic interventions, address functional mobility deficits, address ROM deficits, address strength deficits, analyze and address soft tissue restrictions, analyze and cue movement patterns, analyze and modify body mechanics/ergonomics, assess and modify postural abnormalities and instruct in home and community integration to attain remaining goals. Progress toward goals / Updated goals: Will likely d/c next session due to pt ability to self manage sx PLAN 
[]  Upgrade activities as tolerated yes Continue plan of care  
[]  Discharge due to :   
[]  Other:   
 
Therapist: Anjelica Chappell, PT Date: 4/11/2019 Time: 3:30 PM  
 
Future Appointments Date Time Provider Carla Catalan 4/16/2019  3:30 PM Juan Ramon Chávez, PT [facility]

## 2019-04-16 ENCOUNTER — HOSPITAL ENCOUNTER (OUTPATIENT)
Dept: PHYSICAL THERAPY | Age: 19
Discharge: HOME | End: 2019-04-16
Payer: COMMERCIAL

## 2019-04-16 PROCEDURE — 97110 THERAPEUTIC EXERCISES: CPT | Performed by: PHYSICAL THERAPIST

## 2019-04-16 PROCEDURE — 97140 MANUAL THERAPY 1/> REGIONS: CPT | Performed by: PHYSICAL THERAPIST

## 2019-04-16 NOTE — PROGRESS NOTES
Aquilino Case PHYSICAL THERAPY - DAILY TREATMENT NOTE Patient Name: Cheri Capps        Date: 2019 : 2000   yes Patient  Verified Visit #:    Insurance: Payor: Paresh Muse / Plan: [address] [place] [address] / Product Type: PPO / In time: 330 Out time: 440 Total Treatment Time: 72 Medicare/[employer]BS Time Tracking (below) Total Timed Codes (min):  65 1:1 Treatment Time:  72 TREATMENT AREA =  Right shoulder pain [M25.511] SUBJECTIVE Pain Level (on 0 to 10 scale):  0  / 10 Medication Changes/New allergies or changes in medical history, any new surgeries or procedures? yes  If yes, update Summary List  
Subjective Functional Status/Changes:  []  No changes reported See dc OBJECTIVE 45 min Therapeutic Exercise:  [x]  See flow sheet Rationale:      increase ROM and increase strength to improve the patients ability to return to sport 20 min Manual Therapy: GHJ prom all directions, ir/er stretch in supine and prone, post cuff release, lat release, grade iii post mob, t3-6 pa mob grade iii, kinesio tape for anterior shoulder stability Rationale:      decrease pain, increase ROM, increase tissue extensibility and decrease trigger points to improve patient's ability to return to sport Billed With/As: 
 [x] TE 
 [] TA 
 [] Neuro 
 [] Self Care Patient Education: [x] Review HEP [] Progressed/Changed HEP based on:  
[x] positioning   [x] body mechanics   [] transfers   [x] heat/ice application   
[x] other: avoid throwing Other Objective/Functional Measures: 
See dc Post Treatment Pain Level (on 0 to 10) scale:   0  / 10 ASSESSMENT Assessment/Changes in Function:  
 
  
[]  See Progress Note/Recertification Patient will continue to benefit from skilled PT services to se dc Progress toward goals / Updated goals: 
See dc PLAN 
[]  Upgrade activities as tolerated yes Continue plan of care  
[]  Discharge due to :   
[]  Other: Therapist: Parul Mcguire, PT Date: 4/16/2019 Time: 3:30 PM  
 
Future Appointments Date Time Provider Carla Catalan 4/16/2019  3:30 PM Christos Chávez, PT [facility]

## 2020-03-26 NOTE — PROGRESS NOTES
.[facility] [address] THERAPY  [address] [facility], [address], [place], [address] [facility] - Phone: [phone]  Fax: [phone]  DISCHARGE SUMMARY  Patient Name: Destiny Boo : 2000   Treatment/Medical Diagnosis: Right shoulder pain [M25.511]   Referral Source: Laura Rojas MD     Date of Initial Visit: 19 Attended Visits: 19 Missed Visits: 0     SUMMARY OF TREATMENT  Pt was seen for IE and 18 f/u visits since over last 9  weeks with treatment consisting of therapeutic exercises for shoulder rom/leland-scapular   CURRENT STATUS  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 1. Pt will throw >/=60' without progressive pain   Status at last Eval: Pain  Current Status:  yes   2. Pt will be I with advanced throwing program to progress towards d/c   Status at last Eval:  Current Status:  yes   3. Pt will increase FOTO an additional 3 points in order to show functional improvement   Status at last Eval: 71/100 Current Status: 69/100 no     RECOMMENDATIONS  Discontinue therapy. Progressing towards or have reached established goals. If you have any questions/comments please contact us directly at [phone]. Thank you for allowing us to assist in the care of your patient.     Therapist Signature: Anjleica Chappell, PT Date: 3/26/20     Time: 3:57 PM